# Patient Record
Sex: FEMALE | Race: WHITE | NOT HISPANIC OR LATINO | Employment: FULL TIME | URBAN - NONMETROPOLITAN AREA
[De-identification: names, ages, dates, MRNs, and addresses within clinical notes are randomized per-mention and may not be internally consistent; named-entity substitution may affect disease eponyms.]

---

## 2023-03-08 DIAGNOSIS — E03.9 HYPOTHYROIDISM, UNSPECIFIED TYPE: Primary | ICD-10-CM

## 2023-03-08 RX ORDER — LEVOTHYROXINE SODIUM 75 UG/1
75 TABLET ORAL
COMMUNITY
End: 2023-09-11 | Stop reason: SDUPTHER

## 2023-03-08 RX ORDER — LEVOTHYROXINE SODIUM 75 UG/1
TABLET ORAL
Qty: 90 TABLET | Refills: 3 | Status: SHIPPED | OUTPATIENT
Start: 2023-03-08 | End: 2024-02-26

## 2023-03-27 ENCOUNTER — HOSPITAL ENCOUNTER (OUTPATIENT)
Dept: DATA CONVERSION | Facility: HOSPITAL | Age: 58
End: 2023-03-27
Attending: OBSTETRICS & GYNECOLOGY | Admitting: OBSTETRICS & GYNECOLOGY
Payer: COMMERCIAL

## 2023-03-27 DIAGNOSIS — E03.9 HYPOTHYROIDISM, UNSPECIFIED: ICD-10-CM

## 2023-03-27 DIAGNOSIS — R87.613 HIGH GRADE SQUAMOUS INTRAEPITHELIAL LESION ON CYTOLOGIC SMEAR OF CERVIX (HGSIL): ICD-10-CM

## 2023-03-27 DIAGNOSIS — R87.611 ATYPICAL SQUAMOUS CELLS CANNOT EXCLUDE HIGH GRADE SQUAMOUS INTRAEPITHELIAL LESION ON CYTOLOGIC SMEAR OF CERVIX (ASC-H): ICD-10-CM

## 2023-03-27 DIAGNOSIS — E78.5 HYPERLIPIDEMIA, UNSPECIFIED: ICD-10-CM

## 2023-04-19 LAB
COMPLETE PATHOLOGY REPORT: NORMAL
CONVERTED CLINICAL DIAGNOSIS-HISTORY: NORMAL
CONVERTED FINAL DIAGNOSIS: NORMAL
CONVERTED FINAL REPORT PDF LINK TO COPY AND PASTE: NORMAL
CONVERTED GROSS DESCRIPTION: NORMAL

## 2023-06-14 DIAGNOSIS — E03.9 HYPOTHYROIDISM, UNSPECIFIED TYPE: ICD-10-CM

## 2023-06-14 PROBLEM — R21 RASH: Status: ACTIVE | Noted: 2023-06-14

## 2023-06-14 PROBLEM — R87.612 PAP SMEAR ABNORMALITY OF CERVIX WITH LGSIL: Status: ACTIVE | Noted: 2023-06-14

## 2023-06-14 PROBLEM — R13.12 DYSPHAGIA, OROPHARYNGEAL PHASE: Status: ACTIVE | Noted: 2023-06-14

## 2023-06-14 PROBLEM — R87.810 CERVICAL HIGH RISK HPV (HUMAN PAPILLOMAVIRUS) TEST POSITIVE: Status: ACTIVE | Noted: 2023-06-14

## 2023-06-14 PROBLEM — R59.1 LYMPHADENOPATHY: Status: ACTIVE | Noted: 2023-06-14

## 2023-06-14 PROBLEM — M25.521 RIGHT ELBOW PAIN: Status: ACTIVE | Noted: 2023-06-14

## 2023-06-14 PROBLEM — R22.1 NECK MASS: Status: ACTIVE | Noted: 2023-06-14

## 2023-06-14 PROBLEM — E78.5 HYPERLIPIDEMIA: Status: ACTIVE | Noted: 2023-06-14

## 2023-06-14 PROBLEM — R87.619 ABNORMAL PAP SMEAR OF CERVIX: Status: ACTIVE | Noted: 2023-06-14

## 2023-06-14 PROBLEM — L03.90 CELLULITIS: Status: ACTIVE | Noted: 2023-06-14

## 2023-06-14 RX ORDER — ESTRADIOL 4 UG/1
INSERT VAGINAL
Status: ON HOLD | COMMUNITY
Start: 2023-05-16 | End: 2024-01-25 | Stop reason: WASHOUT

## 2023-06-15 ENCOUNTER — LAB (OUTPATIENT)
Dept: LAB | Facility: LAB | Age: 58
End: 2023-06-15
Payer: COMMERCIAL

## 2023-06-15 DIAGNOSIS — E03.9 HYPOTHYROIDISM, UNSPECIFIED TYPE: ICD-10-CM

## 2023-06-15 LAB — THYROTROPIN (MIU/L) IN SER/PLAS BY DETECTION LIMIT <= 0.05 MIU/L: 0.63 MIU/L (ref 0.44–3.98)

## 2023-06-15 PROCEDURE — 84443 ASSAY THYROID STIM HORMONE: CPT

## 2023-06-15 PROCEDURE — 36415 COLL VENOUS BLD VENIPUNCTURE: CPT

## 2023-09-07 VITALS
DIASTOLIC BLOOD PRESSURE: 44 MMHG | RESPIRATION RATE: 16 BRPM | WEIGHT: 110.23 LBS | BODY MASS INDEX: 20.28 KG/M2 | SYSTOLIC BLOOD PRESSURE: 112 MMHG | HEART RATE: 64 BPM | HEIGHT: 62 IN | TEMPERATURE: 97.9 F

## 2023-09-11 PROBLEM — N89.8 VAGINAL DRYNESS: Status: ACTIVE | Noted: 2023-09-11

## 2023-09-11 PROBLEM — N94.10 DYSPAREUNIA, FEMALE: Status: ACTIVE | Noted: 2023-09-11

## 2023-09-13 NOTE — PROGRESS NOTES
"Cheryl Wyatt is a 57 y.o. female who presents for Annual Exam (Declining flu shot)    Abnormal pap: She had another abnormal pap, colposcopy/ and LEEP.  states that it isfrom erosion and started her on imvexxy. They are doing another papin October. If it is abnormal again she is going to pressure him to get a hysterectomy.      Hypothyroidism: On replacement, feeling good. Wants to stay at her current dose. She believes that the Imvexxy has affected her thyroid levels because she is gaining weight.      HLD: Diet controlled.      All other systems have been reviewed and are negative for complaint        Objective   /73   Pulse 70   Ht 1.575 m (5' 2\")   Wt 53.1 kg (117 lb)   BMI 21.40 kg/m²     Gen: No acute distress, alert and oriented x3, pleasant   HEENT: moist mucous membranes, b/l external auditory canals are clear of debris, TMs within normal limits, no oropharyngeal lesions, eomi, perrla   Neck: thyroid within normal limits, no lymphadenopathy   CV: RRR, normal S1/S2, no murmur   Resp: Clear to auscultation bilaterally, no wheezes or rhonchi appreciated  Abd: soft, nontender, non-distended, no guarding/rigidity, bowel sounds present  Extr: no edema, no calf tenderness  Derm: Skin is warm and dry, no rashes appreciated  Psych: mood is good, affect is congruent, good hygiene, normal speech and eye contact  Neuro: cranial nerves grossly intact, normal gait    Assessment/Plan     #Abnormal pap  Now on imvexxy  Planning for repeat in October  She is hoping for hysterectomy     #LAD  biopsy was negative  Has not followed up with ENT     #Hypothyroidism:  On replacement  TSH is low but she is feeling good     #HLD:  Diet controlled, [place] was under 2%  monitoring lipids     #Raynaud's disease:  Not on meds     HCM:  Declining flu shot, UTD for COVID  Mammogram June  Pap due October, gyn  C-scope due at age 59   "

## 2023-09-14 NOTE — H&P
History of Present Illness:   Pregnant/Lactating:  ·  Are You Pregnant no (1)   ·  Are You Currently Breastfeeding no (1)     History Present Illness:  Reason for surgery: Recurrent abnormal Pap smears,  rule out high-grade dysplasia   HPI:    Patient is a 57-year-old female with a past medical history that includes dysphagia, hyperlipidemia and hypothyroidism who is here for a cervical conization LEEP  procedure.  She was last seen by Dr. Cole on 2/6/2022.  She has had a long history of abnormal Pap smears.  A previous LEEP about 1 year ago showed no evidence of dysplasia but on a follow-up Pap there was low-grade changes, she was positive for HPV  and a colposcopy was done in office.  The last LEEP was February 24, 2022 at [facility] with Dr. Cole.  A repeat LEEP was suggested before a hysterectomy. She states she had a heavy amount of bleeding after her previous LEEP.     Past medical history: Dysphagia, hyperlipidemia, hypothyroidism  Past surgical history: LEEP, lymph node removal,/right shoulder, scope of right knee, back surgery which included a disc replacement of L4-L5 and a fusion of L5-S1  Social history: Denies all  Allergies: Flexeril    Allergies:        Allergies:  ·  Flexeril : Itching    Home Medication Review:   Home Medications Reviewed: yes     Impression/Procedure:   ·  Impression and Planned Procedure: Abnormal Pap smear  Cervical conization LEEP       ERAS (Enhanced Recovery After Surgery):  ·  ERAS Patient: no     Review of Systems:   Review of Systems:  Constitutional: NEGATIVE: Fever, Chills, Malaise     Eyes: NEGATIVE: Vision Loss/ Change     ENMT: NEGATIVE: Nasal Discharge, Nasal Congestion     Respiratory: NEGATIVE: Dry Cough, Productive Cough,  Shortness of Breath     Cardiac: NEGATIVE: Chest Pain     Gastrointestinal: NEGATIVE: Nausea, Vomiting     Genitourinary: NEGATIVE: Discharge, Dysuria, Flank  Pain, Frequency, Hematuria     Musculoskeletal: NEGATIVE: Weakness     Neurological:  "NEGATIVE: Headache     Skin: NEGATIVE: Rash     All Other Systems: All other systems reviewed and  are negative       Vital Signs:  Temperature C: 36.6 degrees C   Temperature F: 97.8 degrees F   Heart Rate: 64 beats per minute   Respiratory Rate: 16 breath per minute   Blood Pressure Systolic: 112 mm/Hg   Blood Pressure Diastolic: 44 mm/Hg     Physical Exam by System:    Constitutional: Well developed, A&O x3, in no apparent  distress, alert and cooperative   Eyes: PERRL, EOMI, nonicteric   ENMT: Mucous membranes moist, no apparent injury   Head/Neck: Neck supple, no apparent injury   Respiratory/Thorax: Patent airways, CTAB, normal  breath sounds with good chest expansion, thorax symmetric   Cardiovascular: Regular, normal S 1and S 2, no murmurs,  2+ equal pulses of the extremities   Neurological: CN II-XII grossly intact   Psychological: Appropriate mood and behavior   Skin: Warm and dry, no lesions, no rashes     Consent:   COVID-19 Consent:  ·  COVID-19 Risk Consent Surgeon has reviewed key risks related to the risk of eloina COVID-19 and if they contract COVID-19 what the risks are.       Electronic Signatures:  Mitul Cole)   (Signed 27-Mar-2023 17:12)   Co-Signer: History of Present Illness, Allergies, Home Medication Review, Impression/Procedure, Review of Systems, Physical Exam, Consent, Note Completion, Maryjo Dhillon (PAC)   (Signed 27-Mar-2023 09:46)   Entered: Allergies, History of Present Illness, Home Medication Review, Impression/Procedure, Review of Systems, Physical Exam, ERAS, Consent, Note Completion   Authored: History of Present Illness, Allergies, Home Medication Review, Impression/Procedure, Review of Systems, Physical Exam, Consent, Note Completion, ERAS    Last Updated: 27-Mar-2023 17:12 by Mitul Cole)    References:  1.  Data Referenced From \"Patient Profile - Preop v3\" 27-Mar-2023 08:27  "

## 2023-09-20 ENCOUNTER — OFFICE VISIT (OUTPATIENT)
Dept: PRIMARY CARE | Facility: CLINIC | Age: 58
End: 2023-09-20
Payer: COMMERCIAL

## 2023-09-20 VITALS
HEART RATE: 70 BPM | HEIGHT: 62 IN | WEIGHT: 117 LBS | SYSTOLIC BLOOD PRESSURE: 116 MMHG | BODY MASS INDEX: 21.53 KG/M2 | DIASTOLIC BLOOD PRESSURE: 73 MMHG

## 2023-09-20 DIAGNOSIS — Z12.31 ENCOUNTER FOR SCREENING MAMMOGRAM FOR MALIGNANT NEOPLASM OF BREAST: Primary | ICD-10-CM

## 2023-09-20 DIAGNOSIS — E03.9 HYPOTHYROIDISM, UNSPECIFIED TYPE: ICD-10-CM

## 2023-09-20 DIAGNOSIS — Z13.220 SCREENING FOR HYPERLIPIDEMIA: ICD-10-CM

## 2023-09-20 PROBLEM — R22.1 NECK MASS: Status: RESOLVED | Noted: 2023-06-14 | Resolved: 2023-09-20

## 2023-09-20 PROBLEM — R13.12 DYSPHAGIA, OROPHARYNGEAL PHASE: Status: RESOLVED | Noted: 2023-06-14 | Resolved: 2023-09-20

## 2023-09-20 PROBLEM — M25.521 RIGHT ELBOW PAIN: Status: RESOLVED | Noted: 2023-06-14 | Resolved: 2023-09-20

## 2023-09-20 PROBLEM — R21 RASH: Status: RESOLVED | Noted: 2023-06-14 | Resolved: 2023-09-20

## 2023-09-20 PROBLEM — L03.90 CELLULITIS: Status: RESOLVED | Noted: 2023-06-14 | Resolved: 2023-09-20

## 2023-09-20 PROBLEM — R87.619 ABNORMAL PAP SMEAR OF CERVIX: Status: RESOLVED | Noted: 2023-06-14 | Resolved: 2023-09-20

## 2023-09-20 PROCEDURE — 99214 OFFICE O/P EST MOD 30 MIN: CPT | Performed by: FAMILY MEDICINE

## 2023-09-20 PROCEDURE — 1036F TOBACCO NON-USER: CPT | Performed by: FAMILY MEDICINE

## 2023-10-02 NOTE — OP NOTE
Post Operative Note:     Post-Procedure Diagnosis: HGSIL Pap   Procedure: 1.  LEEP  2.   3.   4.   5.   Surgeon: Kelsey   Resident/Fellow/Other Assistant: No   Estimated Blood Loss (mL): none   Specimen: yes. Exocervix   Findings: Atrophic vagina and cervix     Operative Report Dictated:  Dictation: not applicable - note contains Operative  Report   Note Recipients: Mitul Cole MD  Danii Cortez  - [phone] [Preferred]   Operative Report:    March 27, 2023, [facility]    Preop diagnosis was HGSIL Pap, history of prior LEEP procedure    Postop diagnosis the same    Surgeon myself    General anesthetic    Under general anesthetic lithotomy position patient prepped draped usual manner.  Plainfield insulated speculum placed.  Using the medium size loop electrode the exocervix was excised.  Base of the cervix cauterized for hemostasis.  Minimal blood loss.   Correct counts.  Exocervix sent as pathology.  Tolerated procedure well taken recovery room in stable condition.    Attestation:   Note Completion:  Attending Attestation I performed the procedure without a resident         Electronic Signatures:  Mitlu Cole)  (Signed 27-Mar-2023 11:22)   Authored: Post Operative Note, Note Completion      Last Updated: 27-Mar-2023 11:22 by Mitul Cole)

## 2023-10-14 ENCOUNTER — OFFICE VISIT (OUTPATIENT)
Dept: OBSTETRICS AND GYNECOLOGY | Facility: CLINIC | Age: 58
End: 2023-10-14
Payer: COMMERCIAL

## 2023-10-14 VITALS — BODY MASS INDEX: 21.4 KG/M2 | WEIGHT: 117 LBS | DIASTOLIC BLOOD PRESSURE: 70 MMHG | SYSTOLIC BLOOD PRESSURE: 115 MMHG

## 2023-10-14 DIAGNOSIS — R87.611 ATYPICAL SQUAMOUS CELLS CANNOT EXCLUDE HIGH GRADE SQUAMOUS INTRAEPITHELIAL LESION ON CYTOLOGIC SMEAR OF CERVIX (ASC-H): Primary | ICD-10-CM

## 2023-10-14 PROCEDURE — 87624 HPV HI-RISK TYP POOLED RSLT: CPT

## 2023-10-14 PROCEDURE — 88141 CYTOPATH C/V INTERPRET: CPT | Performed by: PATHOLOGY

## 2023-10-14 PROCEDURE — 99214 OFFICE O/P EST MOD 30 MIN: CPT | Performed by: OBSTETRICS & GYNECOLOGY

## 2023-10-14 PROCEDURE — 1036F TOBACCO NON-USER: CPT | Performed by: OBSTETRICS & GYNECOLOGY

## 2023-10-14 PROCEDURE — 88175 CYTOPATH C/V AUTO FLUID REDO: CPT

## 2023-10-14 NOTE — PROGRESS NOTES
Subjective   Patient ID: Cheryl Wyatt is a 58 y.o. female who presents for repap (Leep 3/23).  58-year-old patient following up after recent LEEP procedure     0.  In menopause.  In her 30s she had abnormal Paps and was interested in hysterectomy but insurance that she was too young    In the past 2 years she has had abnormal Pap smears.  We did a Pap in 2022 and then another Pap in 2023.  Neither of the surgical pathology showed any evidence of dysplasia.    She comes in today for follow-up Pap but she is at a point where if she continues to have abnormal Pap smears she would really like to proceed with hysterectomy.  We did review that there are different types of abnormalities so an minor abnormality would not necessitate a hysterectomy but if there is evidence of dysplasia I would 100% agree that it is time to proceed with hysterectomy since she has had 2 LEEP procedures in the past year and a half        Review of Systems   All other systems reviewed and are negative.      Objective   Physical Exam    Assessment/Plan   Triage based on today's Pap results.  2 prior LEEP procedures with no evidence of dysplasia.  Longstanding history of abnormal Paps.  Interested in hysterectomy if evidence of dysplasia recurs  SURGICAL PATHOLOGY RESULTS (Order [id number])  Result Information    Status Priority Source   Final result (2023 1527) Routine CERVIX LEEP     SURGICAL PATHOLOGY RESULTS  Order: [id number]  Status: Final result       Visible to patient: Yes (seen)       Dx: Atypical squamous cells cannot exclud...    0 Result Notes      Component 6 mo ago   Pathology Report Name CHERYL WYATT.                                                                                                 Accession #: [id number]            Pathologist:                   BARBARA FUNG DO  Date of Procedure:    3/27/2023  Date Received:          3/27/2023  Date Reported           2023  Submitting Physician:    SERAFIN COLE MD  Location:                    [facility]  Copy To/Referring/Attending:  SERAFIN COLE MD Other External #                                                                   FINAL DIAGNOSIS  A.  EXOCERVIX; LEEP:  -- SQUAMOUS AND ENDOCERVICAL MUCOSA WITH ALMOST COMPLETE EROSION OF THE  SQUAMOUS EPITHELIUM. SEE NOTE.    Note: Multiple deeper levels were examined.  The endocervical mucosa shows no  significant pathologic finding.  The squamous mucosa shows almost complete  erosion of the epithelium, with only a small foci of atrophic appearing  squamous epithelium present.                                                                                                                                                                                                                                                                                                                                                                                                                                                                                        Electronically Signed Out By BARBARA FUNG DO/DOM  By the signature on this report, the individual or group listed as making the  Final Interpretation/Diagnosis certifies that they have reviewed this case.  Diagnostic interpretation performed at [facility] [address] [facility] [address]           Clinical History:  Patient is a 57-year old female with a past medical history that includes  dysphagia, hyperlipidemia, adn hypothyroidism who is here for a cervical  conization LEEP procedure.  She was last seen by Dr. Cole on 2/6/2022. She has  had a long history of abnormal Pap smears. A previous LEEP about 1 year ago  showed no evidence of dysplasia but on a follow-up Pap there was low-grade  changes, she was positive for HPV and a colposcopy was done in office.  The  last LEEP was February 24, 2022 at [facility] with Dr. Cole. A repeat LEEP was  suggested  "before a hysterectomy.  She state she had a heavy amount of bleeding  after her previous LEEP.    Specimens Submitted As:  A: EXOCERVIX    Gross Description:  Received in formalin, labeled with the patient´s name hospital number and \"A,  exocervix\", is an intact LEEP cone biopsy without orientation measuring 1.3 x  1.2 cm in diameter and 1.0 cm in length. The cervical os is intact and measures  0.3 cm in greatest diameter. The ectocervix is smooth and glistening. The deep  stromal margin and mucosal surfaces are inked black and the endocervical margin  is inked blue. The specimen is serially sectioned radially around the  endocervical canal, and submitted entirely in 8 cassettes.  SURJIT     Summary of Cassettes:  Specimen     Label     Site  A                            1           Quadrant 1                            2           Additional mucosa/trimmings, quadrant 1                            3           Quadrant 2                            4           Additional mucosa/trimmings, quadrant 2                            5           Quadrant 3                            6           Additional mucosa/trimmings, quadrant 3                            7           Quadrant 4                            8           Additional mucosa/trimmings, quadrant 4      surjit/4/12/2023              [facility]  Department of Pathology  [id number] [place] [address]        History of abnormal Pap smears.  2 LEEP procedures in the past year and a half.  Pap obtained today.  Will review results.  Based on results we will treat appropriately    She has been using Imvexxy.  She finds is not really giving her any relief so she will discontinue.     "

## 2023-10-23 ENCOUNTER — HOSPITAL ENCOUNTER (OUTPATIENT)
Dept: RADIOLOGY | Facility: HOSPITAL | Age: 58
Discharge: HOME | End: 2023-10-23
Payer: COMMERCIAL

## 2023-10-23 DIAGNOSIS — Z12.31 ENCOUNTER FOR SCREENING MAMMOGRAM FOR MALIGNANT NEOPLASM OF BREAST: ICD-10-CM

## 2023-10-23 PROCEDURE — 77067 SCR MAMMO BI INCL CAD: CPT

## 2023-10-23 PROCEDURE — 77067 SCR MAMMO BI INCL CAD: CPT | Mod: BILATERAL PROCEDURE | Performed by: RADIOLOGY

## 2023-10-23 PROCEDURE — 77063 BREAST TOMOSYNTHESIS BI: CPT | Mod: BILATERAL PROCEDURE | Performed by: RADIOLOGY

## 2023-10-26 LAB
CYTOLOGY CMNT CVX/VAG CYTO-IMP: NORMAL
HPV HR GENOTYPES PNL CVX NAA+PROBE: POSITIVE
HPV HR GENOTYPES PNL CVX NAA+PROBE: POSITIVE
HPV16 DNA SPEC QL NAA+PROBE: NEGATIVE
HPV18 DNA SPEC QL NAA+PROBE: NEGATIVE
LAB AP HPV GENOTYPE QUESTION: YES
LAB AP HPV HR: NORMAL
LAB AP PREVIOUS ABNORMAL HISTORY: NORMAL
LAB AP TREATMENT HISTORY: NORMAL
LABORATORY COMMENT REPORT: NORMAL
MENSTRUAL HX REPORTED: NORMAL
PATH REPORT.TOTAL CANCER: NORMAL

## 2023-11-10 ENCOUNTER — TELEPHONE (OUTPATIENT)
Dept: PRIMARY CARE | Facility: CLINIC | Age: 58
End: 2023-11-10
Payer: COMMERCIAL

## 2023-11-10 DIAGNOSIS — R21 RASH: Primary | ICD-10-CM

## 2023-11-10 NOTE — TELEPHONE ENCOUNTER
Cheryl called and stated that she found a tick on her a few weeks back that she was able to remove. However, the site is still red in color. She has blood work that was ordered back in September, she is wondering if you can add in a lyme test?

## 2023-11-13 ENCOUNTER — LAB (OUTPATIENT)
Dept: LAB | Facility: LAB | Age: 58
End: 2023-11-13
Payer: COMMERCIAL

## 2023-11-13 DIAGNOSIS — Z13.220 SCREENING FOR HYPERLIPIDEMIA: ICD-10-CM

## 2023-11-13 DIAGNOSIS — R21 RASH: ICD-10-CM

## 2023-11-13 DIAGNOSIS — E03.9 HYPOTHYROIDISM, UNSPECIFIED TYPE: ICD-10-CM

## 2023-11-13 LAB
ALBUMIN SERPL BCP-MCNC: 4.9 G/DL (ref 3.4–5)
ALP SERPL-CCNC: 75 U/L (ref 33–136)
ALT SERPL W P-5'-P-CCNC: 12 U/L (ref 7–45)
ANION GAP SERPL CALC-SCNC: 13 MMOL/L (ref 10–20)
AST SERPL W P-5'-P-CCNC: 20 U/L (ref 9–39)
BASOPHILS # BLD AUTO: 0.03 X10*3/UL (ref 0–0.1)
BASOPHILS NFR BLD AUTO: 0.6 %
BILIRUB SERPL-MCNC: 0.5 MG/DL (ref 0–1.2)
BUN SERPL-MCNC: 13 MG/DL (ref 6–23)
CALCIUM SERPL-MCNC: 9.9 MG/DL (ref 8.6–10.3)
CHLORIDE SERPL-SCNC: 104 MMOL/L (ref 98–107)
CHOLEST SERPL-MCNC: 318 MG/DL (ref 0–199)
CHOLESTEROL/HDL RATIO: 4.1
CO2 SERPL-SCNC: 31 MMOL/L (ref 21–32)
CREAT SERPL-MCNC: 0.91 MG/DL (ref 0.5–1.05)
EOSINOPHIL # BLD AUTO: 0.07 X10*3/UL (ref 0–0.7)
EOSINOPHIL NFR BLD AUTO: 1.4 %
ERYTHROCYTE [DISTWIDTH] IN BLOOD BY AUTOMATED COUNT: 12.7 % (ref 11.5–14.5)
GFR SERPL CREATININE-BSD FRML MDRD: 72 ML/MIN/1.73M*2
GLUCOSE SERPL-MCNC: 77 MG/DL (ref 74–99)
HCT VFR BLD AUTO: 45.3 % (ref 36–46)
HDLC SERPL-MCNC: 76.9 MG/DL
HGB BLD-MCNC: 14.6 G/DL (ref 12–16)
IMM GRANULOCYTES # BLD AUTO: 0.01 X10*3/UL (ref 0–0.7)
IMM GRANULOCYTES NFR BLD AUTO: 0.2 % (ref 0–0.9)
LDLC SERPL CALC-MCNC: 219 MG/DL
LYMPHOCYTES # BLD AUTO: 1.59 X10*3/UL (ref 1.2–4.8)
LYMPHOCYTES NFR BLD AUTO: 32.6 %
MCH RBC QN AUTO: 30.5 PG (ref 26–34)
MCHC RBC AUTO-ENTMCNC: 32.2 G/DL (ref 32–36)
MCV RBC AUTO: 95 FL (ref 80–100)
MONOCYTES # BLD AUTO: 0.43 X10*3/UL (ref 0.1–1)
MONOCYTES NFR BLD AUTO: 8.8 %
NEUTROPHILS # BLD AUTO: 2.75 X10*3/UL (ref 1.2–7.7)
NEUTROPHILS NFR BLD AUTO: 56.4 %
NON HDL CHOLESTEROL: 241 MG/DL (ref 0–149)
NRBC BLD-RTO: 0 /100 WBCS (ref 0–0)
PLATELET # BLD AUTO: 264 X10*3/UL (ref 150–450)
POTASSIUM SERPL-SCNC: 5.6 MMOL/L (ref 3.5–5.3)
PROT SERPL-MCNC: 7.2 G/DL (ref 6.4–8.2)
RBC # BLD AUTO: 4.79 X10*6/UL (ref 4–5.2)
SODIUM SERPL-SCNC: 142 MMOL/L (ref 136–145)
T4 FREE SERPL-MCNC: 1.22 NG/DL (ref 0.61–1.12)
TRIGL SERPL-MCNC: 112 MG/DL (ref 0–149)
TSH SERPL-ACNC: 0.31 MIU/L (ref 0.44–3.98)
VLDL: 22 MG/DL (ref 0–40)
WBC # BLD AUTO: 4.9 X10*3/UL (ref 4.4–11.3)

## 2023-11-13 PROCEDURE — 84443 ASSAY THYROID STIM HORMONE: CPT

## 2023-11-13 PROCEDURE — 80053 COMPREHEN METABOLIC PANEL: CPT

## 2023-11-13 PROCEDURE — 85025 COMPLETE CBC W/AUTO DIFF WBC: CPT

## 2023-11-13 PROCEDURE — 84439 ASSAY OF FREE THYROXINE: CPT

## 2023-11-13 PROCEDURE — 80061 LIPID PANEL: CPT

## 2023-11-13 PROCEDURE — 87476 LYME DIS DNA AMP PROBE: CPT

## 2023-11-13 PROCEDURE — 36415 COLL VENOUS BLD VENIPUNCTURE: CPT

## 2023-11-17 LAB
B BURGDOR DNA SPEC QL NAA+PROBE: NOT DETECTED
SPECIMEN SOURCE: NORMAL

## 2023-11-21 ENCOUNTER — OFFICE VISIT (OUTPATIENT)
Dept: OBSTETRICS AND GYNECOLOGY | Facility: CLINIC | Age: 58
End: 2023-11-21
Payer: COMMERCIAL

## 2023-11-21 ENCOUNTER — PREP FOR PROCEDURE (OUTPATIENT)
Dept: OBSTETRICS AND GYNECOLOGY | Facility: HOSPITAL | Age: 58
End: 2023-11-21

## 2023-11-21 VITALS
WEIGHT: 120 LBS | HEIGHT: 62 IN | SYSTOLIC BLOOD PRESSURE: 102 MMHG | BODY MASS INDEX: 22.08 KG/M2 | DIASTOLIC BLOOD PRESSURE: 62 MMHG

## 2023-11-21 DIAGNOSIS — R87.810 CERVICAL HIGH RISK HPV (HUMAN PAPILLOMAVIRUS) TEST POSITIVE: ICD-10-CM

## 2023-11-21 DIAGNOSIS — N87.9 DYSPLASIA OF CERVIX: Primary | ICD-10-CM

## 2023-11-21 DIAGNOSIS — R87.612 LOW GRADE SQUAMOUS INTRAEPITHELIAL LESION ON CYTOLOGIC SMEAR OF CERVIX (LGSIL): ICD-10-CM

## 2023-11-21 PROCEDURE — 99214 OFFICE O/P EST MOD 30 MIN: CPT | Performed by: OBSTETRICS & GYNECOLOGY

## 2023-11-21 PROCEDURE — 1036F TOBACCO NON-USER: CPT | Performed by: OBSTETRICS & GYNECOLOGY

## 2023-11-21 RX ORDER — CELECOXIB 50 MG/1
400 CAPSULE ORAL ONCE
Status: CANCELLED | OUTPATIENT
Start: 2023-11-21 | End: 2023-11-21

## 2023-11-21 RX ORDER — GABAPENTIN 600 MG/1
600 TABLET ORAL ONCE
Status: CANCELLED | OUTPATIENT
Start: 2023-11-21 | End: 2023-11-21

## 2023-11-21 RX ORDER — ACETAMINOPHEN 325 MG/1
975 TABLET ORAL ONCE
Status: CANCELLED | OUTPATIENT
Start: 2023-11-21 | End: 2023-11-21

## 2023-11-21 NOTE — PROGRESS NOTES
Subjective   Patient ID: Cheryl Wyatt is a 58 y.o. female who presents for Preop consult.    A.  THINPREP PAP ENDOCERVICAL BRUSH:         Specimen adequacy:  SATISFACTORY FOR EVALUATION.  Quality Indicator: Endocervical/transformation zone component is present.         General Categorization:   EPITHELIAL CELL ABNORMALITY - SQUAMOUS CELL.   See Interpretation.         Descriptive Interpretation:  LOW GRADE SQUAMOUS INTRAEPITHELIAL LESION (LSIL) - CERVIX.  ATYPICAL SQUAMOUS CELLS, CANNOT EXCLUDE HIGH GRADE SQUAMOUS INTRAEPITHELIAL  LESION (ASC-H) - CERVIX.    Ancillary Testing:  Specimen does not meet the requisition-stated criteria for HPV testing. See Pap  test interpretation above.      The gross and/or microscopic findings were reviewed in conjunction with  pathology resident, TREVIN Iraheta.    tus: Final result       Visible to patient: Yes (seen)       Next appt: 03/25/2024 at 10:30 AM in Primary Care (Danii Cortez DO)       Dx: Atypical squamous cells cannot exclud...    2 Result Notes       1  Topic     Component  Ref Range & Units   HPV, high-risk  Negative Positive Abnormal   HPV Type 16 DNA  Negative Negative  HPV Type 18 DNA  Negative Negative  HPV non-Type 16 or 18 DNA  Negative Positive Abnormal   Resulting Agency [facility]           Narrative  Performed by: [facility]     Testing for high-risk (HR) types of human papilloma virus (HPV) is performed by the Roche courtney HPV Test. The courtney HPV Test is a qualitative polymerase chain reaction that amplifies DNA of HPV16, HPV18, and 12 other high-risk HPV types (31, 33, 35, 39, 45, 51, 52, 56, 58, 59, 66, and 68) associated with cervical cancer and its precursor lesions. A positive result indicates the presence of HPV DNA due to one or more of the 14 genotypes: 16, 18, 31, 33, 35, 39, 45, 51, 52, 56, 58, 59, 66, and 68. Negative results indicates HPV DNA concentrations are undectectable or below the pre-set threshold for detection. False negative results  may be associated with unoptimized sampling. A negative HR HPV result does not exclude the possibility of future cytologic HSIL or underlying CIN2-3 or cancer.   This test is approved by the US Food and Drug Administration. Results of this test should be interpreted in conjunction with the patient Pap test results. Please refer to ASCCP current quidelines for the use of HPV DNA testing, result interpretation, and patient management.   The performance of this test was verified by the [facility] at [facility]. The lab is certified under the Clinical Laboratory Amendments of 1988 (CLIA 88) as qualified to perform high complexity clinical laboratory testing.    PERFORMING LAB LOCATIONS  [facility]: [facility] [address], [place] [address]    Specimen Collected: 10/14/23 13:29 Last Resulted: 10/26/23 12:41           Established patient 58 years old.  She had 2 previous LEEP procedures.  Recent Pap shows low-grade changes but cannot rule out high-grade.  There is positive high risk HPV.  We have discussed in the past that if she has recurrence of abnormal Paps she is interested in hysterectomy    Past surgery includes an anterior approach to a spinal fusion.  She has a midline incision from the umbilicus on down to the pubic bone.  She also has a scar on the low back.    Current medications include Synthroid.  Allergic to Flexeril gets itchy and jittery.  Mother had a hysterectomy and a bladder lift.  Grandmother with uterine cancer.    Non-smoker.    Would recommend laparoscopic assisted hysterectomy with removal of both tubes and ovaries.  We would start in the left upper quadrant because of her midline incision.  Will review the surgery risk benefits complications and recovery.        Review of Systems   All other systems reviewed and are negative.      Objective   cervix flush with the vagina.  Uterus mobile.  No adnexal masses midline incision from anterior approach to  spinal fusion    Assessment/Plan   Left upper quadrant start.  Laparoscopic-assisted vaginal hysterectomy bilateral salpingo-oophorectomy cystoscopy

## 2023-12-12 ENCOUNTER — PRE-ADMISSION TESTING (OUTPATIENT)
Dept: PREADMISSION TESTING | Facility: HOSPITAL | Age: 58
End: 2023-12-12
Payer: COMMERCIAL

## 2023-12-12 VITALS
TEMPERATURE: 97.9 F | HEART RATE: 68 BPM | BODY MASS INDEX: 22.15 KG/M2 | WEIGHT: 120.37 LBS | SYSTOLIC BLOOD PRESSURE: 112 MMHG | HEIGHT: 62 IN | DIASTOLIC BLOOD PRESSURE: 71 MMHG | RESPIRATION RATE: 18 BRPM | OXYGEN SATURATION: 100 %

## 2023-12-12 DIAGNOSIS — R87.810 CERVICAL HIGH RISK HPV (HUMAN PAPILLOMAVIRUS) TEST POSITIVE: ICD-10-CM

## 2023-12-12 DIAGNOSIS — N87.9 DYSPLASIA OF CERVIX: ICD-10-CM

## 2023-12-12 DIAGNOSIS — Z01.818 PREOP EXAMINATION: Primary | ICD-10-CM

## 2023-12-12 LAB
ABO GROUP (TYPE) IN BLOOD: NORMAL
ANION GAP SERPL CALC-SCNC: 11 MMOL/L (ref 10–20)
ANTIBODY SCREEN: NORMAL
BUN SERPL-MCNC: 11 MG/DL (ref 6–23)
CALCIUM SERPL-MCNC: 9.3 MG/DL (ref 8.6–10.3)
CHLORIDE SERPL-SCNC: 103 MMOL/L (ref 98–107)
CO2 SERPL-SCNC: 31 MMOL/L (ref 21–32)
CREAT SERPL-MCNC: 0.89 MG/DL (ref 0.5–1.05)
ERYTHROCYTE [DISTWIDTH] IN BLOOD BY AUTOMATED COUNT: 12.7 % (ref 11.5–14.5)
GFR SERPL CREATININE-BSD FRML MDRD: 74 ML/MIN/1.73M*2
GLUCOSE SERPL-MCNC: 88 MG/DL (ref 74–99)
HCT VFR BLD AUTO: 39.6 % (ref 36–46)
HGB BLD-MCNC: 12.9 G/DL (ref 12–16)
MCH RBC QN AUTO: 30.2 PG (ref 26–34)
MCHC RBC AUTO-ENTMCNC: 32.6 G/DL (ref 32–36)
MCV RBC AUTO: 93 FL (ref 80–100)
NRBC BLD-RTO: 0 /100 WBCS (ref 0–0)
PLATELET # BLD AUTO: 260 X10*3/UL (ref 150–450)
POTASSIUM SERPL-SCNC: 4.3 MMOL/L (ref 3.5–5.3)
RBC # BLD AUTO: 4.27 X10*6/UL (ref 4–5.2)
RH FACTOR (ANTIGEN D): NORMAL
SODIUM SERPL-SCNC: 141 MMOL/L (ref 136–145)
WBC # BLD AUTO: 5.7 X10*3/UL (ref 4.4–11.3)

## 2023-12-12 PROCEDURE — 93010 ELECTROCARDIOGRAM REPORT: CPT | Performed by: INTERNAL MEDICINE

## 2023-12-12 PROCEDURE — 99204 OFFICE O/P NEW MOD 45 MIN: CPT | Performed by: PHYSICIAN ASSISTANT

## 2023-12-12 PROCEDURE — 93005 ELECTROCARDIOGRAM TRACING: CPT

## 2023-12-12 PROCEDURE — 80048 BASIC METABOLIC PNL TOTAL CA: CPT

## 2023-12-12 PROCEDURE — 86901 BLOOD TYPING SEROLOGIC RH(D): CPT

## 2023-12-12 PROCEDURE — 85027 COMPLETE CBC AUTOMATED: CPT

## 2023-12-12 PROCEDURE — 36415 COLL VENOUS BLD VENIPUNCTURE: CPT

## 2023-12-12 ASSESSMENT — ENCOUNTER SYMPTOMS
MUSCULOSKELETAL NEGATIVE: 1
GASTROINTESTINAL NEGATIVE: 1
CARDIOVASCULAR NEGATIVE: 1
NEUROLOGICAL NEGATIVE: 1
RESPIRATORY NEGATIVE: 1
CONSTITUTIONAL NEGATIVE: 1
NECK NEGATIVE: 1

## 2023-12-12 ASSESSMENT — DUKE ACTIVITY SCORE INDEX (DASI)
CAN YOU WALK A BLOCK OR TWO ON LEVEL GROUND: YES
CAN YOU DO MODERATE WORK AROUND THE HOUSE LIKE VACUUMING, SWEEPING FLOORS OR CARRYING GROCERIES: YES
CAN YOU WALK INDOORS, SUCH AS AROUND YOUR HOUSE: YES
CAN YOU PARTICIPATE IN MODERATE RECREATIONAL ACTIVITIES LIKE GOLF, BOWLING, DANCING, DOUBLES TENNIS OR THROWING A BASEBALL OR FOOTBALL: NO
CAN YOU DO HEAVY WORK AROUND THE HOUSE LIKE SCRUBBING FLOORS OR LIFTING AND MOVING HEAVY FURNITURE: NO
CAN YOU RUN A SHORT DISTANCE: NO
CAN YOU PARTICIPATE IN STRENOUS SPORTS LIKE SWIMMING, SINGLES TENNIS, FOOTBALL, BASKETBALL, OR SKIING: NO
CAN YOU DO YARD WORK LIKE RAKING LEAVES, WEEDING OR PUSHING A MOWER: YES
DASI METS SCORE: 6.3
CAN YOU CLIMB A FLIGHT OF STAIRS OR WALK UP A HILL: YES
TOTAL_SCORE: 28.7
CAN YOU DO LIGHT WORK AROUND THE HOUSE LIKE DUSTING OR WASHING DISHES: YES
CAN YOU HAVE SEXUAL RELATIONS: YES
CAN YOU TAKE CARE OF YOURSELF (EAT, DRESS, BATHE, OR USE TOILET): YES

## 2023-12-12 ASSESSMENT — CHADS2 SCORE
CHADS2 SCORE: 0
CHF: NO
HYPERTENSION: NO
DIABETES: NO
PRIOR STROKE OR TIA OR THROMBOEMBOLISM: NO
AGE GREATER THAN OR EQUAL TO 75: NO

## 2023-12-12 ASSESSMENT — PAIN SCALES - GENERAL: PAINLEVEL_OUTOF10: 0 - NO PAIN

## 2023-12-12 ASSESSMENT — LIFESTYLE VARIABLES: SMOKING_STATUS: NONSMOKER

## 2023-12-12 ASSESSMENT — PAIN - FUNCTIONAL ASSESSMENT: PAIN_FUNCTIONAL_ASSESSMENT: 0-10

## 2023-12-12 NOTE — PREPROCEDURE INSTRUCTIONS
Medication List            Accurate as of December 12, 2023  2:50 PM. Always use your most recent med list.                Imvexxy Maintenance Pack 4 mcg insert  Generic drug: estradioL  Medication Adjustments for Surgery: Other (Comment)  Notes to patient: Pt no longer taking     Synthroid 75 mcg tablet  Generic drug: levothyroxine  TAKE 1 TABLET BY MOUTH ONCE DAILY IN THE MORNING 1 HOUR BEFORE BREAKFAST  Medication Adjustments for Surgery: Take morning of surgery with sip of water, no other fluids                SURGERY PRE-OPERATIVE INSTRUCTIONS    *You will receive a phone call the day before your procedure  after 2pm, (or the Friday before your surgery if scheduled on a Monday.) Generally the hospital will be calling you with this information after that time.    *You are not to eat after midnight the night before the surgery. You may have 8oz of a clear liquid up until 2 hours prior to arriving to the hospital. The exception is with medications you were instructed to take day of surgery.    *You may take tylenol for pain/discomfort as needed.     *Stop taking all aspirin products, ibuprofen (motrin/advil), naproxen (aleve/naprosyn) for one week prior to surgery.    *Stop taking all vitamins and supplements one week prior to surgery.     *You should not have alcoholic beverages for 24 hours before surgery.     *You should not smoke 24 hours prior to surgery.     *To help prevent surgical infections bathe/shower with Dial soap the evening before surgery.    *You can wear deodorant but no lotion, powder, or perfume/cologne. You should remove all make-up and nail polish at home.    *If you wear glasses, please bring a case for the glasses with you.    *You will be asked to remove dentures and contacts.     *Please leave all valuables at home.    *You should wear loose, comfortable clothing that will accommodate bandages and/or casts.    *You should notify your doctor of any change in your condition (fever, cold,  rash, etc). Surgery may need to be re-scheduled until a time you are in better health.    *A responsible adult is required to accompany you to and from the hospital if you are receiving anesthesia or a sedative. Patients are not permitted to drive for 24 hours after anesthesia.     *You can use the Rapid7g if you wish.     *If you have any further questions please call [phone].     CHG BODY WASH INSTRUCTIONS    *Begin using your CHG soap five days prior to your scheduled surgery.  Allow the CHG soap to sit on skin for 3 minutes. Do not wash with regular soap after you have used the CHG soap. Pat yourself dry with a clean, fresh towel.    *Wash your face with normal soap and water. Apply the CHG solution to a clean, wet washcloth. Firmly lather your entire body from the neck down. Do not use on your face.     *Do not apply powders, deodorants, or lotions after using CHG wash.    *Dress in clean, freshly laundered night clothes.    *Be sure to sleep with clean, freshly laundered sheets.     *Be aware CHG wash may cause stains on fabrics. Rinse your washcloth and other linens that come in contact with CHG completely. Use non-chlorine detergents to launder items used.     *The morning of surgery is the fifth day, repeat the CHG wash and wear fresh laundered clothes.     *If you have any questions about the CHG soap, call [phone].

## 2023-12-12 NOTE — CPM/PAT H&P
CPM/PAT Evaluation       Name: Cheryl Wyatt (Cheryl Wyatt)  /Age: 1965/58 y.o.     In-Person       Chief Complaint: abnormal PAP     59 y/o female scheduled for hysterectomy on 23 with Dr. Cole secondary to abnormal PAP.  PMHX includes hypothyroidism.  Presents to [facility] today for perioperative risk stratification. Patient has had multiple abnormal PAPs and been treated with multiple colposcopies and a LEEP procedure but continues to have abnormal PAPs after 5 years. Requests hysterectomy.         Past Medical History:   Diagnosis Date    Cervical dysplasia     Cervical high risk human papillomavirus (HPV) DNA test positive     History of Graves' disease     Hypothyroidism     PONV (postoperative nausea and vomiting)     Raynaud's disease        Past Surgical History:   Procedure Laterality Date    ANTERIOR SPINAL FUSION      L5-S1    CERVICAL BIOPSY  W/ LOOP ELECTRODE EXCISION      x2    LUMBAR DISCECTOMY      L4-5    LYMPH NODE BIOPSY      US GUIDED MEDIASTINUM PERCUTANEOUS BIOPSY  10/14/2021    US GUIDED MEDIASTINUM PERCUTANEOUS BIOPSY 10/14/2021 [facility]       Patient  has no history on file for sexual activity.    Family History   Problem Relation Name Age of Onset    Stroke Mother          s/p surgery    Brain Aneurysm Mother  88    Other (cardiac valve replacement) Father      Coronary artery disease Father      Coronary artery disease Sister      Rheum arthritis Sister      Coronary artery disease Brother      Uterine cancer Paternal Grandmother         Allergies   Allergen Reactions    Cyclobenzaprine Itching       Prior to Admission medications    Medication Sig Start Date End Date Taking? Authorizing Provider   Synthroid 75 mcg tablet TAKE 1 TABLET BY MOUTH ONCE DAILY IN THE MORNING 1 HOUR BEFORE BREAKFAST 3/8/23  Yes Danii Cortez DO   Imvexxy Maintenance Pack 4 mcg insert INSERT 4 MCG AS DIRECTED TWICE WEEKLY 23   Historical Provider, MD DIETZ ROS:    Constitutional:   neg    Neuro/Psych:   neg    Eyes:   Ears:   Nose:   Mouth:   neg    Throat:   neg    Neck:   neg    Cardio:   neg    Respiratory:   neg    Endocrine:   GI:   neg    :   neg    Musculoskeletal:   neg    Hematologic:   neg    Skin:      Physical Exam  Vitals and nursing note reviewed.   Constitutional:       Appearance: Normal appearance.   HENT:      Head: Normocephalic and atraumatic.      Nose: Nose normal.      Mouth/Throat:      Mouth: Mucous membranes are moist.      Pharynx: Oropharynx is clear.   Eyes:      Conjunctiva/sclera: Conjunctivae normal.      Pupils: Pupils are equal, round, and reactive to light.   Cardiovascular:      Rate and Rhythm: Normal rate and regular rhythm.      Pulses: Normal pulses.      Heart sounds: Normal heart sounds.   Pulmonary:      Effort: Pulmonary effort is normal.      Breath sounds: Normal breath sounds.   Abdominal:      General: Abdomen is flat. Bowel sounds are normal.      Palpations: Abdomen is soft.   Musculoskeletal:         General: Normal range of motion.      Cervical back: Normal range of motion and neck supple.   Skin:     General: Skin is warm and dry.   Neurological:      General: No focal deficit present.      Mental Status: She is alert.   Psychiatric:         Mood and Affect: Mood normal.         Behavior: Behavior normal.          PAT AIRWAY:   Airway:     Mallampati::  I    Neck ROM::  Full  normal        Visit Vitals  /71   Pulse 68   Temp 36.6 °C (97.9 °F) (Temporal)   Resp 18       DASI Risk Score      Flowsheet Row Most Recent Value   DASI SCORE 28.7   METS Score (Will be calculated only when all the questions are answered) 6.3          Caprini DVT Assessment      Flowsheet Row Most Recent Value   DVT Score 6   Current Status Major surgery planned, including arthroscopic and laproscopic (1-2 hours)   History Prior major surgery   Age 60-75 years   BMI 30 or less          Modified Frailty Index    No data to display        CHADS2 Stroke Risk  Current as of 13 minutes ago        N/A 3 - 100%: High Risk   2 - 3%: Medium Risk   0 - 2%: Low Risk     Last Change: N/A          This score determines the patient's risk of having a stroke if the patient has atrial fibrillation.        This score is not applicable to this patient. Components are not calculated.          Revised Cardiac Risk Index      Flowsheet Row Most Recent Value   Revised Cardiac Risk Calculator 0          Apfel Simplified Score      Flowsheet Row Most Recent Value   Apfel Simplified Score Calculator 4          Risk Analysis Index Results This Encounter    No data found in the last 1 encounters.       Stop Bang Score      Flowsheet Row Most Recent Value   Do you snore loudly? 0   Do you often feel tired or fatigued after your sleep? 0   Has anyone ever observed you stop breathing in your sleep? 0   Do you have or are you being treated for high blood pressure? 0   Recent BMI (Calculated) 22   Is BMI greater than 35 kg/m2? 0=No   Age older than 50 years old? 1=Yes   Is your neck circumference greater than 17 inches (Male) or 16 inches (Female)? 0   Gender - Male 0=No   STOP-BANG Total Score 1          59 y/o female scheduled for hysterectomy on 23 with Dr. Cole secondary to abnormal PAP.     PMHX includes hypothyroidism: continue current meds    Anesthesia issues: PONV    H/O DVT: no     Sleep apnea: no     H/O transfusions: no    EK23 NSR, nonspecific ST abnormality.     Clearances: none    Patient verbalized understanding of preop instructions given in PAT

## 2023-12-19 ENCOUNTER — ANESTHESIA EVENT (OUTPATIENT)
Dept: OPERATING ROOM | Facility: HOSPITAL | Age: 58
End: 2023-12-19
Payer: COMMERCIAL

## 2023-12-21 ENCOUNTER — ANESTHESIA (OUTPATIENT)
Dept: OPERATING ROOM | Facility: HOSPITAL | Age: 58
End: 2023-12-21
Payer: COMMERCIAL

## 2023-12-21 ENCOUNTER — TELEPHONE (OUTPATIENT)
Dept: PRIMARY CARE | Facility: CLINIC | Age: 58
End: 2023-12-21
Payer: COMMERCIAL

## 2023-12-21 DIAGNOSIS — U07.1 COVID-19: Primary | ICD-10-CM

## 2023-12-21 RX ORDER — NIRMATRELVIR AND RITONAVIR 300-100 MG
3 KIT ORAL 2 TIMES DAILY
Qty: 30 TABLET | Refills: 0 | Status: SHIPPED | OUTPATIENT
Start: 2023-12-21 | End: 2023-12-26

## 2023-12-27 LAB
ATRIAL RATE: 65 BPM
P AXIS: 63 DEGREES
P OFFSET: 188 MS
P ONSET: 151 MS
PR INTERVAL: 146 MS
Q ONSET: 224 MS
QRS COUNT: 11 BEATS
QRS DURATION: 68 MS
QT INTERVAL: 384 MS
QTC CALCULATION(BAZETT): 399 MS
QTC FREDERICIA: 394 MS
R AXIS: 56 DEGREES
T AXIS: 24 DEGREES
T OFFSET: 416 MS
VENTRICULAR RATE: 65 BPM

## 2024-01-23 ENCOUNTER — TELEPHONE (OUTPATIENT)
Dept: PREADMISSION TESTING | Facility: HOSPITAL | Age: 59
End: 2024-01-23
Payer: COMMERCIAL

## 2024-01-23 RX ORDER — BISMUTH SUBSALICYLATE 262 MG
1 TABLET,CHEWABLE ORAL DAILY
COMMUNITY

## 2024-01-23 NOTE — TELEPHONE ENCOUNTER
Pre-procedure [facility] phone assessment completed. Pre-operative and medication instructions reviewed with patient. States she stopped her MVI last week. Patient verbalizes understanding of instructions. States she had PAT last month then had Covid and had to reschedule surgery. States she has followed all instructions given at [facility] and has started body wash procedure. .  SURGERY PRE-OPERATIVE INSTRUCTIONS    *You will receive a phone call the day before your procedure  after 2pm, (or the Friday before your surgery if scheduled on a Monday.) Generally the hospital will be calling you with this information after that time.    *You are not to eat after midnight the night before the surgery. You may have 8oz of a clear liquid up until 2 hours prior to arriving to the hospital. The exception is with medications you were instructed to take day of surgery.    *You may take tylenol for pain/discomfort as needed.     *Stop taking all aspirin products, ibuprofen (motrin/advil), naproxen (aleve/naprosyn) for one week prior to surgery.    *Stop taking all vitamins and supplements one week prior to surgery.     *You should not have alcoholic beverages for 24 hours before surgery.     *You should not smoke 24 hours prior to surgery.     *To help prevent surgical infections bathe/shower with Dial soap the evening before surgery.    *You can wear deodorant but no lotion, powder, or perfume/cologne. You should remove all make-up and nail polish at home.    *If you wear glasses, please bring a case for the glasses with you.    *You will be asked to remove dentures and contacts.     *Please leave all valuables at home.    *You should wear loose, comfortable clothing that will accommodate bandages and/or casts.    *You should notify your doctor of any change in your condition (fever, cold, rash, etc). Surgery may need to be re-scheduled until a time you are in better health.    *A responsible adult is required to accompany you to and from  the hospital if you are receiving anesthesia or a sedative. Patients are not permitted to drive for 24 hours after anesthesia.     *You can use the [employer] parking if you wish.     *If you have any further questions please call [phone].    CHG BODY WASH INSTRUCTIONS    *Begin using your CHG soap five days prior to your scheduled surgery.  Allow the CHG soap to sit on skin for 3 minutes. Do not wash with regular soap after you have used the CHG soap. Pat yourself dry with a clean, fresh towel.    *Wash your face with normal soap and water. Apply the CHG solution to a clean, wet washcloth. Firmly lather your entire body from the neck down. Do not use on your face.     *Do not apply powders, deodorants, or lotions after using CHG wash.    *Dress in clean, freshly laundered night clothes.    *Be sure to sleep with clean, freshly laundered sheets.     *Be aware CHG wash may cause stains on fabrics. Rinse your washcloth and other linens that come in contact with CHG completely. Use non-chlorine detergents to launder items used.     *The morning of surgery is the fifth day, repeat the CHG wash and wear fresh laundered clothes.     *If you have any questions about the CHG soap, call [phone].

## 2024-01-25 ENCOUNTER — HOSPITAL ENCOUNTER (OUTPATIENT)
Facility: HOSPITAL | Age: 59
LOS: 1 days | Discharge: HOME | End: 2024-01-26
Attending: OBSTETRICS & GYNECOLOGY | Admitting: OBSTETRICS & GYNECOLOGY
Payer: COMMERCIAL

## 2024-01-25 DIAGNOSIS — R87.810 CERVICAL HIGH RISK HPV (HUMAN PAPILLOMAVIRUS) TEST POSITIVE: ICD-10-CM

## 2024-01-25 DIAGNOSIS — N87.9 DYSPLASIA OF CERVIX: Primary | ICD-10-CM

## 2024-01-25 PROBLEM — T88.59XA DELAYED EMERGENCE FROM ANESTHESIA: Status: ACTIVE | Noted: 2024-01-25

## 2024-01-25 PROBLEM — R11.2 PONV (POSTOPERATIVE NAUSEA AND VOMITING): Status: ACTIVE | Noted: 2024-01-25

## 2024-01-25 PROBLEM — Z98.890 PONV (POSTOPERATIVE NAUSEA AND VOMITING): Status: ACTIVE | Noted: 2024-01-25

## 2024-01-25 LAB
ABO GROUP (TYPE) IN BLOOD: NORMAL
ANION GAP SERPL CALC-SCNC: 17 MMOL/L (ref 10–20)
ANTIBODY SCREEN: NORMAL
BUN SERPL-MCNC: 14 MG/DL (ref 6–23)
CALCIUM SERPL-MCNC: 9 MG/DL (ref 8.6–10.3)
CHLORIDE SERPL-SCNC: 102 MMOL/L (ref 98–107)
CO2 SERPL-SCNC: 26 MMOL/L (ref 21–32)
CREAT SERPL-MCNC: 0.9 MG/DL (ref 0.5–1.05)
EGFRCR SERPLBLD CKD-EPI 2021: 73 ML/MIN/1.73M*2
ERYTHROCYTE [DISTWIDTH] IN BLOOD BY AUTOMATED COUNT: 12.9 % (ref 11.5–14.5)
GLUCOSE SERPL-MCNC: 124 MG/DL (ref 74–99)
HCT VFR BLD AUTO: 38.7 % (ref 36–46)
HGB BLD-MCNC: 12.9 G/DL (ref 12–16)
MCH RBC QN AUTO: 30.3 PG (ref 26–34)
MCHC RBC AUTO-ENTMCNC: 33.3 G/DL (ref 32–36)
MCV RBC AUTO: 91 FL (ref 80–100)
NRBC BLD-RTO: 0 /100 WBCS (ref 0–0)
PLATELET # BLD AUTO: 226 X10*3/UL (ref 150–450)
POTASSIUM SERPL-SCNC: 4.7 MMOL/L (ref 3.5–5.3)
RBC # BLD AUTO: 4.26 X10*6/UL (ref 4–5.2)
RH FACTOR (ANTIGEN D): NORMAL
SODIUM SERPL-SCNC: 140 MMOL/L (ref 136–145)
WBC # BLD AUTO: 10.7 X10*3/UL (ref 4.4–11.3)

## 2024-01-25 PROCEDURE — 3700000001 HC GENERAL ANESTHESIA TIME - INITIAL BASE CHARGE: Performed by: OBSTETRICS & GYNECOLOGY

## 2024-01-25 PROCEDURE — 36415 COLL VENOUS BLD VENIPUNCTURE: CPT | Performed by: ANESTHESIOLOGY

## 2024-01-25 PROCEDURE — 2780000003 HC OR 278 NO HCPCS: Performed by: OBSTETRICS & GYNECOLOGY

## 2024-01-25 PROCEDURE — 2500000004 HC RX 250 GENERAL PHARMACY W/ HCPCS (ALT 636 FOR OP/ED): Performed by: ANESTHESIOLOGY

## 2024-01-25 PROCEDURE — 88307 TISSUE EXAM BY PATHOLOGIST: CPT | Mod: TC,SUR,GEALAB | Performed by: OBSTETRICS & GYNECOLOGY

## 2024-01-25 PROCEDURE — 36415 COLL VENOUS BLD VENIPUNCTURE: CPT | Performed by: OBSTETRICS & GYNECOLOGY

## 2024-01-25 PROCEDURE — 7100000001 HC RECOVERY ROOM TIME - INITIAL BASE CHARGE: Performed by: OBSTETRICS & GYNECOLOGY

## 2024-01-25 PROCEDURE — 1100000001 HC PRIVATE ROOM DAILY

## 2024-01-25 PROCEDURE — 86901 BLOOD TYPING SEROLOGIC RH(D): CPT | Performed by: ANESTHESIOLOGY

## 2024-01-25 PROCEDURE — 58552 LAPARO-VAG HYST INCL T/O: CPT | Performed by: OBSTETRICS & GYNECOLOGY

## 2024-01-25 PROCEDURE — 3700000002 HC GENERAL ANESTHESIA TIME - EACH INCREMENTAL 1 MINUTE: Performed by: OBSTETRICS & GYNECOLOGY

## 2024-01-25 PROCEDURE — A58552 PR LAP,VAG HYST,UTERUS 250GMS/<,SALP-OOPH: Performed by: NURSE ANESTHETIST, CERTIFIED REGISTERED

## 2024-01-25 PROCEDURE — 2720000007 HC OR 272 NO HCPCS: Performed by: OBSTETRICS & GYNECOLOGY

## 2024-01-25 PROCEDURE — 80048 BASIC METABOLIC PNL TOTAL CA: CPT | Performed by: OBSTETRICS & GYNECOLOGY

## 2024-01-25 PROCEDURE — 85027 COMPLETE CBC AUTOMATED: CPT | Performed by: OBSTETRICS & GYNECOLOGY

## 2024-01-25 PROCEDURE — A4217 STERILE WATER/SALINE, 500 ML: HCPCS | Performed by: OBSTETRICS & GYNECOLOGY

## 2024-01-25 PROCEDURE — 3600000004 HC OR TIME - INITIAL BASE CHARGE - PROCEDURE LEVEL FOUR: Performed by: OBSTETRICS & GYNECOLOGY

## 2024-01-25 PROCEDURE — 7100000002 HC RECOVERY ROOM TIME - EACH INCREMENTAL 1 MINUTE: Performed by: OBSTETRICS & GYNECOLOGY

## 2024-01-25 PROCEDURE — 3600000009 HC OR TIME - EACH INCREMENTAL 1 MINUTE - PROCEDURE LEVEL FOUR: Performed by: OBSTETRICS & GYNECOLOGY

## 2024-01-25 PROCEDURE — 2500000005 HC RX 250 GENERAL PHARMACY W/O HCPCS: Performed by: NURSE ANESTHETIST, CERTIFIED REGISTERED

## 2024-01-25 PROCEDURE — 2500000004 HC RX 250 GENERAL PHARMACY W/ HCPCS (ALT 636 FOR OP/ED): Performed by: OBSTETRICS & GYNECOLOGY

## 2024-01-25 PROCEDURE — 88307 TISSUE EXAM BY PATHOLOGIST: CPT | Performed by: PATHOLOGY

## 2024-01-25 PROCEDURE — 2500000001 HC RX 250 WO HCPCS SELF ADMINISTERED DRUGS (ALT 637 FOR MEDICARE OP): Performed by: OBSTETRICS & GYNECOLOGY

## 2024-01-25 PROCEDURE — 2500000004 HC RX 250 GENERAL PHARMACY W/ HCPCS (ALT 636 FOR OP/ED): Performed by: NURSE ANESTHETIST, CERTIFIED REGISTERED

## 2024-01-25 RX ORDER — SODIUM CHLORIDE, SODIUM LACTATE, POTASSIUM CHLORIDE, CALCIUM CHLORIDE 600; 310; 30; 20 MG/100ML; MG/100ML; MG/100ML; MG/100ML
100 INJECTION, SOLUTION INTRAVENOUS CONTINUOUS
Status: DISCONTINUED | OUTPATIENT
Start: 2024-01-25 | End: 2024-01-25 | Stop reason: HOSPADM

## 2024-01-25 RX ORDER — MIDAZOLAM HYDROCHLORIDE 1 MG/ML
INJECTION INTRAMUSCULAR; INTRAVENOUS AS NEEDED
Status: DISCONTINUED | OUTPATIENT
Start: 2024-01-25 | End: 2024-01-25

## 2024-01-25 RX ORDER — AMOXICILLIN 250 MG
2 CAPSULE ORAL 2 TIMES DAILY
Status: DISCONTINUED | OUTPATIENT
Start: 2024-01-25 | End: 2024-01-26 | Stop reason: HOSPADM

## 2024-01-25 RX ORDER — POLYETHYLENE GLYCOL 3350 17 G/17G
17 POWDER, FOR SOLUTION ORAL DAILY PRN
Status: DISCONTINUED | OUTPATIENT
Start: 2024-01-25 | End: 2024-01-26 | Stop reason: HOSPADM

## 2024-01-25 RX ORDER — PROPOFOL 10 MG/ML
INJECTION, EMULSION INTRAVENOUS AS NEEDED
Status: DISCONTINUED | OUTPATIENT
Start: 2024-01-25 | End: 2024-01-25

## 2024-01-25 RX ORDER — ACETAMINOPHEN 325 MG/1
975 TABLET ORAL ONCE
Status: COMPLETED | OUTPATIENT
Start: 2024-01-25 | End: 2024-01-25

## 2024-01-25 RX ORDER — CEFAZOLIN 1 G/1
INJECTION, POWDER, FOR SOLUTION INTRAVENOUS AS NEEDED
Status: DISCONTINUED | OUTPATIENT
Start: 2024-01-25 | End: 2024-01-25

## 2024-01-25 RX ORDER — ONDANSETRON HYDROCHLORIDE 2 MG/ML
4 INJECTION, SOLUTION INTRAVENOUS ONCE AS NEEDED
Status: DISCONTINUED | OUTPATIENT
Start: 2024-01-25 | End: 2024-01-25 | Stop reason: HOSPADM

## 2024-01-25 RX ORDER — TRAMADOL HYDROCHLORIDE 50 MG/1
50 TABLET ORAL EVERY 6 HOURS PRN
Status: DISCONTINUED | OUTPATIENT
Start: 2024-01-25 | End: 2024-01-26 | Stop reason: HOSPADM

## 2024-01-25 RX ORDER — GABAPENTIN 300 MG/1
600 CAPSULE ORAL ONCE
Status: COMPLETED | OUTPATIENT
Start: 2024-01-25 | End: 2024-01-25

## 2024-01-25 RX ORDER — DROPERIDOL 2.5 MG/ML
0.62 INJECTION, SOLUTION INTRAMUSCULAR; INTRAVENOUS ONCE AS NEEDED
Status: DISCONTINUED | OUTPATIENT
Start: 2024-01-25 | End: 2024-01-25 | Stop reason: HOSPADM

## 2024-01-25 RX ORDER — ROCURONIUM BROMIDE 10 MG/ML
INJECTION, SOLUTION INTRAVENOUS AS NEEDED
Status: DISCONTINUED | OUTPATIENT
Start: 2024-01-25 | End: 2024-01-25

## 2024-01-25 RX ORDER — SODIUM CHLORIDE, SODIUM LACTATE, POTASSIUM CHLORIDE, CALCIUM CHLORIDE 600; 310; 30; 20 MG/100ML; MG/100ML; MG/100ML; MG/100ML
100 INJECTION, SOLUTION INTRAVENOUS CONTINUOUS
Status: DISCONTINUED | OUTPATIENT
Start: 2024-01-25 | End: 2024-01-26 | Stop reason: HOSPADM

## 2024-01-25 RX ORDER — PROPOFOL 10 MG/ML
INJECTION, EMULSION INTRAVENOUS CONTINUOUS PRN
Status: DISCONTINUED | OUTPATIENT
Start: 2024-01-25 | End: 2024-01-25

## 2024-01-25 RX ORDER — LEVOTHYROXINE SODIUM 75 UG/1
75 TABLET ORAL
Status: DISCONTINUED | OUTPATIENT
Start: 2024-01-26 | End: 2024-01-26 | Stop reason: HOSPADM

## 2024-01-25 RX ORDER — FENTANYL CITRATE 50 UG/ML
INJECTION, SOLUTION INTRAMUSCULAR; INTRAVENOUS AS NEEDED
Status: DISCONTINUED | OUTPATIENT
Start: 2024-01-25 | End: 2024-01-25

## 2024-01-25 RX ORDER — SIMETHICONE 80 MG
80 TABLET,CHEWABLE ORAL 4 TIMES DAILY PRN
Status: DISCONTINUED | OUTPATIENT
Start: 2024-01-25 | End: 2024-01-26 | Stop reason: HOSPADM

## 2024-01-25 RX ORDER — METHYLENE BLUE 5 MG/ML
INJECTION INTRAVENOUS AS NEEDED
Status: DISCONTINUED | OUTPATIENT
Start: 2024-01-25 | End: 2024-01-25

## 2024-01-25 RX ORDER — ONDANSETRON HYDROCHLORIDE 2 MG/ML
4 INJECTION, SOLUTION INTRAVENOUS EVERY 6 HOURS PRN
Status: DISCONTINUED | OUTPATIENT
Start: 2024-01-25 | End: 2024-01-26 | Stop reason: HOSPADM

## 2024-01-25 RX ORDER — ONDANSETRON HYDROCHLORIDE 2 MG/ML
INJECTION, SOLUTION INTRAVENOUS AS NEEDED
Status: DISCONTINUED | OUTPATIENT
Start: 2024-01-25 | End: 2024-01-25

## 2024-01-25 RX ORDER — LIDOCAINE HCL/PF 100 MG/5ML
SYRINGE (ML) INTRAVENOUS AS NEEDED
Status: DISCONTINUED | OUTPATIENT
Start: 2024-01-25 | End: 2024-01-25

## 2024-01-25 RX ORDER — CELECOXIB 400 MG/1
400 CAPSULE ORAL ONCE
Status: DISCONTINUED | OUTPATIENT
Start: 2024-01-25 | End: 2024-01-25 | Stop reason: HOSPADM

## 2024-01-25 RX ORDER — SODIUM CHLORIDE 0.9 G/100ML
IRRIGANT IRRIGATION AS NEEDED
Status: DISCONTINUED | OUTPATIENT
Start: 2024-01-25 | End: 2024-01-25 | Stop reason: HOSPADM

## 2024-01-25 RX ORDER — SCOLOPAMINE TRANSDERMAL SYSTEM 1 MG/1
1 PATCH, EXTENDED RELEASE TRANSDERMAL ONCE
Status: DISCONTINUED | OUTPATIENT
Start: 2024-01-25 | End: 2024-01-25

## 2024-01-25 RX ORDER — ENOXAPARIN SODIUM 100 MG/ML
40 INJECTION SUBCUTANEOUS EVERY 24 HOURS
Status: COMPLETED | OUTPATIENT
Start: 2024-01-25 | End: 2024-01-25

## 2024-01-25 RX ORDER — HYDROMORPHONE HYDROCHLORIDE 2 MG/ML
INJECTION, SOLUTION INTRAMUSCULAR; INTRAVENOUS; SUBCUTANEOUS AS NEEDED
Status: DISCONTINUED | OUTPATIENT
Start: 2024-01-25 | End: 2024-01-25

## 2024-01-25 RX ORDER — NALOXONE HYDROCHLORIDE 0.4 MG/ML
0.1 INJECTION, SOLUTION INTRAMUSCULAR; INTRAVENOUS; SUBCUTANEOUS EVERY 5 MIN PRN
Status: DISCONTINUED | OUTPATIENT
Start: 2024-01-25 | End: 2024-01-26 | Stop reason: HOSPADM

## 2024-01-25 RX ORDER — SODIUM CHLORIDE, SODIUM LACTATE, POTASSIUM CHLORIDE, CALCIUM CHLORIDE 600; 310; 30; 20 MG/100ML; MG/100ML; MG/100ML; MG/100ML
40 INJECTION, SOLUTION INTRAVENOUS CONTINUOUS
Status: DISCONTINUED | OUTPATIENT
Start: 2024-01-25 | End: 2024-01-26 | Stop reason: HOSPADM

## 2024-01-25 RX ORDER — OXYCODONE HYDROCHLORIDE 5 MG/1
5 TABLET ORAL EVERY 4 HOURS PRN
Status: DISCONTINUED | OUTPATIENT
Start: 2024-01-25 | End: 2024-01-25 | Stop reason: HOSPADM

## 2024-01-25 RX ORDER — DEXAMETHASONE SODIUM PHOSPHATE 4 MG/ML
INJECTION, SOLUTION INTRA-ARTICULAR; INTRALESIONAL; INTRAMUSCULAR; INTRAVENOUS; SOFT TISSUE AS NEEDED
Status: DISCONTINUED | OUTPATIENT
Start: 2024-01-25 | End: 2024-01-25

## 2024-01-25 RX ORDER — OXYCODONE HYDROCHLORIDE 5 MG/1
5 TABLET ORAL EVERY 4 HOURS PRN
Status: DISCONTINUED | OUTPATIENT
Start: 2024-01-25 | End: 2024-01-26 | Stop reason: HOSPADM

## 2024-01-25 RX ADMIN — PROPOFOL 140 MG: 10 INJECTION, EMULSION INTRAVENOUS at 10:29

## 2024-01-25 RX ADMIN — HYDROMORPHONE HYDROCHLORIDE 0.4 MG: 2 INJECTION, SOLUTION INTRAMUSCULAR; INTRAVENOUS; SUBCUTANEOUS at 11:40

## 2024-01-25 RX ADMIN — ONDANSETRON 4 MG: 2 INJECTION, SOLUTION INTRAMUSCULAR; INTRAVENOUS at 12:03

## 2024-01-25 RX ADMIN — SODIUM CHLORIDE, POTASSIUM CHLORIDE, SODIUM LACTATE AND CALCIUM CHLORIDE: 600; 310; 30; 20 INJECTION, SOLUTION INTRAVENOUS at 12:07

## 2024-01-25 RX ADMIN — ROCURONIUM BROMIDE 20 MG: 10 INJECTION, SOLUTION INTRAVENOUS at 11:09

## 2024-01-25 RX ADMIN — GABAPENTIN 300 MG: 300 CAPSULE ORAL at 08:54

## 2024-01-25 RX ADMIN — LIDOCAINE HYDROCHLORIDE 50 MG: 20 INJECTION, SOLUTION INTRAVENOUS at 10:29

## 2024-01-25 RX ADMIN — HYDROMORPHONE HYDROCHLORIDE 0.2 MG: 2 INJECTION, SOLUTION INTRAMUSCULAR; INTRAVENOUS; SUBCUTANEOUS at 11:20

## 2024-01-25 RX ADMIN — SENNOSIDES AND DOCUSATE SODIUM 2 TABLET: 8.6; 5 TABLET ORAL at 20:23

## 2024-01-25 RX ADMIN — SODIUM CHLORIDE, POTASSIUM CHLORIDE, SODIUM LACTATE AND CALCIUM CHLORIDE 40 ML/HR: 600; 310; 30; 20 INJECTION, SOLUTION INTRAVENOUS at 18:27

## 2024-01-25 RX ADMIN — FENTANYL CITRATE 25 MCG: 50 INJECTION, SOLUTION INTRAMUSCULAR; INTRAVENOUS at 10:22

## 2024-01-25 RX ADMIN — ACETAMINOPHEN 975 MG: 325 TABLET ORAL at 08:54

## 2024-01-25 RX ADMIN — FENTANYL CITRATE 50 MCG: 50 INJECTION, SOLUTION INTRAMUSCULAR; INTRAVENOUS at 10:29

## 2024-01-25 RX ADMIN — ROCURONIUM BROMIDE 10 MG: 10 INJECTION, SOLUTION INTRAVENOUS at 11:50

## 2024-01-25 RX ADMIN — SUGAMMADEX 200 MG: 100 INJECTION, SOLUTION INTRAVENOUS at 12:32

## 2024-01-25 RX ADMIN — MIDAZOLAM HYDROCHLORIDE 1 MG: 1 INJECTION, SOLUTION INTRAMUSCULAR; INTRAVENOUS at 10:22

## 2024-01-25 RX ADMIN — FENTANYL CITRATE 25 MCG: 50 INJECTION, SOLUTION INTRAMUSCULAR; INTRAVENOUS at 11:04

## 2024-01-25 RX ADMIN — ROCURONIUM BROMIDE 50 MG: 10 INJECTION, SOLUTION INTRAVENOUS at 10:29

## 2024-01-25 RX ADMIN — DEXAMETHASONE SODIUM PHOSPHATE 8 MG: 4 INJECTION INTRA-ARTICULAR; INTRALESIONAL; INTRAMUSCULAR; INTRAVENOUS; SOFT TISSUE at 10:29

## 2024-01-25 RX ADMIN — ONDANSETRON 4 MG: 2 INJECTION, SOLUTION INTRAMUSCULAR; INTRAVENOUS at 10:29

## 2024-01-25 RX ADMIN — HYDROMORPHONE HYDROCHLORIDE 0.4 MG: 2 INJECTION, SOLUTION INTRAMUSCULAR; INTRAVENOUS; SUBCUTANEOUS at 10:53

## 2024-01-25 RX ADMIN — SCOPOLAMINE 1 PATCH: 1.5 PATCH, EXTENDED RELEASE TRANSDERMAL at 08:58

## 2024-01-25 RX ADMIN — METHYLENE BLUE 25 MG: 5 INJECTION INTRAVENOUS at 10:36

## 2024-01-25 RX ADMIN — ENOXAPARIN SODIUM 40 MG: 40 INJECTION SUBCUTANEOUS at 20:22

## 2024-01-25 RX ADMIN — SODIUM CHLORIDE, POTASSIUM CHLORIDE, SODIUM LACTATE AND CALCIUM CHLORIDE 100 ML/HR: 600; 310; 30; 20 INJECTION, SOLUTION INTRAVENOUS at 08:52

## 2024-01-25 RX ADMIN — PROPOFOL 25 MCG/KG/MIN: 10 INJECTION, EMULSION INTRAVENOUS at 11:13

## 2024-01-25 RX ADMIN — CEFAZOLIN 2 G: 330 INJECTION, POWDER, FOR SOLUTION INTRAMUSCULAR; INTRAVENOUS at 10:27

## 2024-01-25 SDOH — SOCIAL STABILITY: SOCIAL INSECURITY: WERE YOU ABLE TO COMPLETE ALL THE BEHAVIORAL HEALTH SCREENINGS?: YES

## 2024-01-25 SDOH — SOCIAL STABILITY: SOCIAL INSECURITY: DO YOU FEEL UNSAFE GOING BACK TO THE PLACE WHERE YOU ARE LIVING?: NO

## 2024-01-25 SDOH — SOCIAL STABILITY: SOCIAL INSECURITY: DOES ANYONE TRY TO KEEP YOU FROM HAVING/CONTACTING OTHER FRIENDS OR DOING THINGS OUTSIDE YOUR HOME?: NO

## 2024-01-25 SDOH — SOCIAL STABILITY: SOCIAL INSECURITY: ARE THERE ANY APPARENT SIGNS OF INJURIES/BEHAVIORS THAT COULD BE RELATED TO ABUSE/NEGLECT?: NO

## 2024-01-25 SDOH — HEALTH STABILITY: MENTAL HEALTH: CURRENT SMOKER: 0

## 2024-01-25 SDOH — SOCIAL STABILITY: SOCIAL INSECURITY: ARE YOU OR HAVE YOU BEEN THREATENED OR ABUSED PHYSICALLY, EMOTIONALLY, OR SEXUALLY BY ANYONE?: NO

## 2024-01-25 SDOH — SOCIAL STABILITY: SOCIAL INSECURITY: HAVE YOU HAD THOUGHTS OF HARMING ANYONE ELSE?: NO

## 2024-01-25 SDOH — SOCIAL STABILITY: SOCIAL INSECURITY: HAS ANYONE EVER THREATENED TO HURT YOUR FAMILY OR YOUR PETS?: NO

## 2024-01-25 SDOH — SOCIAL STABILITY: SOCIAL INSECURITY: DO YOU FEEL ANYONE HAS EXPLOITED OR TAKEN ADVANTAGE OF YOU FINANCIALLY OR OF YOUR PERSONAL PROPERTY?: NO

## 2024-01-25 SDOH — SOCIAL STABILITY: SOCIAL INSECURITY: ABUSE: ADULT

## 2024-01-25 ASSESSMENT — PAIN - FUNCTIONAL ASSESSMENT
PAIN_FUNCTIONAL_ASSESSMENT: 0-10
PAIN_FUNCTIONAL_ASSESSMENT: UNABLE TO SELF-REPORT
PAIN_FUNCTIONAL_ASSESSMENT: 0-10
PAIN_FUNCTIONAL_ASSESSMENT: UNABLE TO SELF-REPORT

## 2024-01-25 ASSESSMENT — ACTIVITIES OF DAILY LIVING (ADL)
HEARING - RIGHT EAR: FUNCTIONAL
PATIENT'S MEMORY ADEQUATE TO SAFELY COMPLETE DAILY ACTIVITIES?: YES
ASSISTIVE_DEVICE: EYEGLASSES
LACK_OF_TRANSPORTATION: NO
DRESSING YOURSELF: INDEPENDENT
JUDGMENT_ADEQUATE_SAFELY_COMPLETE_DAILY_ACTIVITIES: YES
FEEDING YOURSELF: INDEPENDENT
GROOMING: INDEPENDENT
ADEQUATE_TO_COMPLETE_ADL: YES
TOILETING: INDEPENDENT
HEARING - LEFT EAR: FUNCTIONAL
BATHING: INDEPENDENT
WALKS IN HOME: INDEPENDENT

## 2024-01-25 ASSESSMENT — COGNITIVE AND FUNCTIONAL STATUS - GENERAL
DAILY ACTIVITIY SCORE: 24
PATIENT BASELINE BEDBOUND: NO
MOBILITY SCORE: 24
DAILY ACTIVITIY SCORE: 24
MOBILITY SCORE: 24

## 2024-01-25 ASSESSMENT — LIFESTYLE VARIABLES
SKIP TO QUESTIONS 9-10: 1
AUDIT-C TOTAL SCORE: 1
HOW MANY STANDARD DRINKS CONTAINING ALCOHOL DO YOU HAVE ON A TYPICAL DAY: 1 OR 2
PRESCIPTION_ABUSE_PAST_12_MONTHS: NO
SUBSTANCE_ABUSE_PAST_12_MONTHS: NO
HOW OFTEN DO YOU HAVE 6 OR MORE DRINKS ON ONE OCCASION: NEVER
AUDIT-C TOTAL SCORE: 1
HOW OFTEN DO YOU HAVE A DRINK CONTAINING ALCOHOL: MONTHLY OR LESS

## 2024-01-25 ASSESSMENT — PATIENT HEALTH QUESTIONNAIRE - PHQ9
2. FEELING DOWN, DEPRESSED OR HOPELESS: NOT AT ALL
1. LITTLE INTEREST OR PLEASURE IN DOING THINGS: NOT AT ALL
SUM OF ALL RESPONSES TO PHQ9 QUESTIONS 1 & 2: 0

## 2024-01-25 ASSESSMENT — PAIN SCALES - GENERAL
PAINLEVEL_OUTOF10: 3
PAINLEVEL_OUTOF10: 0 - NO PAIN

## 2024-01-25 ASSESSMENT — COLUMBIA-SUICIDE SEVERITY RATING SCALE - C-SSRS
1. IN THE PAST MONTH, HAVE YOU WISHED YOU WERE DEAD OR WISHED YOU COULD GO TO SLEEP AND NOT WAKE UP?: NO
2. HAVE YOU ACTUALLY HAD ANY THOUGHTS OF KILLING YOURSELF?: NO
6. HAVE YOU EVER DONE ANYTHING, STARTED TO DO ANYTHING, OR PREPARED TO DO ANYTHING TO END YOUR LIFE?: NO

## 2024-01-25 NOTE — H&P
History Of Present Illness  Cheryl Wyatt is a 58 y.o. female presenting with    Mitul Cole MD  Physician  Obstetrics     Progress Notes     Signed     Encounter Date: 11/21/2023     Signed       Expand All Collapse All       Subjective   Patient ID: Cheryl Wyatt is a 58 y.o. female who presents for Preop consult.     A.  THINPREP PAP ENDOCERVICAL BRUSH:         Specimen adequacy:  SATISFACTORY FOR EVALUATION.  Quality Indicator: Endocervical/transformation zone component is present.         General Categorization:   EPITHELIAL CELL ABNORMALITY - SQUAMOUS CELL.   See Interpretation.         Descriptive Interpretation:  LOW GRADE SQUAMOUS INTRAEPITHELIAL LESION (LSIL) - CERVIX.  ATYPICAL SQUAMOUS CELLS, CANNOT EXCLUDE HIGH GRADE SQUAMOUS INTRAEPITHELIAL  LESION (ASC-H) - CERVIX.     Ancillary Testing:  Specimen does not meet the requisition-stated criteria for HPV testing. See Pap  test interpretation above.        The gross and/or microscopic findings were reviewed in conjunction with  pathology resident, TREVIN Iraheta.     tus: Final result       Visible to patient: Yes (seen)       Next appt: 03/25/2024 at 10:30 AM in Primary Care (Danii Cortez DO)       Dx: Atypical squamous cells cannot exclud...    2 Result Notes       1  Topic                Component  Ref Range & Units         HPV, high-risk  Negative          Positive Abnormal   HPV Type 16 DNA  Negative          Negative  HPV Type 18 DNA  Negative          Negative  HPV non-Type 16 or 18 DNA  Negative          Positive Abnormal   Resulting Agency        [facility]              Narrative  Performed by: [facility]      Testing for high-risk (HR) types of human papilloma virus (HPV) is performed by the Roche courtney HPV Test. The courtney HPV Test is a qualitative polymerase chain reaction that amplifies DNA of HPV16, HPV18, and 12 other high-risk HPV types (31, 33, 35, 39, 45, 51, 52, 56, 58, 59, 66, and 68) associated with cervical cancer and its precursor  lesions. A positive result indicates the presence of HPV DNA due to one or more of the 14 genotypes: 16, 18, 31, 33, 35, 39, 45, 51, 52, 56, 58, 59, 66, and 68. Negative results indicates HPV DNA concentrations are undectectable or below the pre-set threshold for detection. False negative results may be associated with unoptimized sampling. A negative HR HPV result does not exclude the possibility of future cytologic HSIL or underlying CIN2-3 or cancer.   This test is approved by the US Food and Drug Administration. Results of this test should be interpreted in conjunction with the patient Pap test results. Please refer to ASCCP current quidelines for the use of HPV DNA testing, result interpretation, and patient management.   The performance of this test was verified by the [facility] at [facility]. The lab is certified under the Clinical Laboratory Amendments of 1988 (CLIA 88) as qualified to perform high complexity clinical laboratory testing.     PERFORMING LAB LOCATIONS  [facility]: [facility] [place] [address]     Specimen Collected: 10/14/23 13:29  Last Resulted: 10/26/23 12:41              Established patient 58 years old.  She had 2 previous LEEP procedures.  Recent Pap shows low-grade changes but cannot rule out high-grade.  There is positive high risk HPV.  We have discussed in the past that if she has recurrence of abnormal Paps she is interested in hysterectomy     Past surgery includes an anterior approach to a spinal fusion.  She has a midline incision from the umbilicus on down to the pubic bone.  She also has a scar on the low back.     Current medications include Synthroid.  Allergic to Flexeril gets itchy and jittery.  Mother had a hysterectomy and a bladder lift.  Grandmother with uterine cancer.     Non-smoker.     Would recommend laparoscopic assisted hysterectomy with removal of both tubes and ovaries.  We would start in the left upper  quadrant because of her midline incision.  Will review the surgery risk benefits complications and recovery.           Review of Systems   All other systems reviewed and are negative.              Objective   cervix flush with the vagina.  Uterus mobile.  No adnexal masses midline incision from anterior approach to spinal fusion           Assessment/Plan   Left upper quadrant start.  Laparoscopic-assisted vaginal hysterectomy bilateral salpingo-oophorectomy cystoscop            .     Past Medical History  Past Medical History:   Diagnosis Date    Cervical dysplasia     Cervical high risk HPV (human papillomavirus) test positive     COVID-19     12/20/23    Delayed emergence from general anesthesia     History of Graves' disease     Hypothyroidism     Lymphadenopathy     PONV (postoperative nausea and vomiting)     Raynaud's disease     Wears glasses        Surgical History  Past Surgical History:   Procedure Laterality Date    ANTERIOR SPINAL FUSION  2008    L5-S1    CERVICAL BIOPSY  W/ LOOP ELECTRODE EXCISION      x2    KNEE ARTHROSCOPY W/ DEBRIDEMENT Right     LUMBAR DISCECTOMY  2008    L4-5    LYMPH NODE BIOPSY      SHOULDER Right     flap repair    US GUIDED MEDIASTINUM PERCUTANEOUS BIOPSY  10/14/2021    US GUIDED MEDIASTINUM PERCUTANEOUS BIOPSY 10/14/2021 [facility]        Social History  She reports that she has never smoked. She has never been exposed to tobacco smoke. She has never used smokeless tobacco. She reports current alcohol use. She reports that she does not use drugs.    Family History  Family History   Problem Relation Name Age of Onset    Stroke Mother          s/p surgery    Brain Aneurysm Mother  88    Other (cardiac valve replacement) Father      Coronary artery disease Father      Coronary artery disease Sister      Rheum arthritis Sister      Coronary artery disease Brother      Uterine cancer Paternal Grandmother          Allergies  Cyclobenzaprine    Review of Systems     Physical  "Exam     Last Recorded Vitals  Blood pressure 111/51, pulse 72, temperature 36.5 °C (97.7 °F), resp. rate 16, height 1.575 m (5' 2\"), weight 54 kg (119 lb 0.8 oz), SpO2 100 %.    Relevant Results             Assessment/Plan   Principal Problem:    Dysplasia of cervix  Active Problems:    Cervical high risk HPV (human papillomavirus) test positive      Laparoscopic assisted vaginal hysterectomy bilateral salpingo oophorectomy otoscopy.  Left upper quadrant start.  Reviewed surgery risk benefits complications recovery.        Mitul Cole MD    "

## 2024-01-25 NOTE — ANESTHESIA POSTPROCEDURE EVALUATION
Patient: Cheryl Wyatt    Procedure Summary       Date: 01/25/24 Room / Location: [facility] OR 07 / [facility]    Anesthesia Start: 1019 Anesthesia Stop: 1243    Procedures:       HYSTERECTOMY TRANSVAGINAL LAPAROSCOPY-ASSISTED WITH SALPINGO-OOPHORECTOMY (Uterus)      CYSTOSCOPY RIGID (Vagina ) Diagnosis:       Dysplasia of cervix      Cervical high risk HPV (human papillomavirus) test positive      (Dysplasia of cervix [N87.9])      (Cervical high risk HPV (human papillomavirus) test positive [R87.810])    Surgeons: Mitul Cole MD Responsible Provider: ROMEO Simms    Anesthesia Type: general ASA Status: 2            Anesthesia Type: general    Vitals Value Taken Time   /69 01/25/24 1310   Temp  01/25/24 1327   Pulse 59 01/25/24 1310   Resp 15 01/25/24 1310   SpO2 100 % 01/25/24 1259       Anesthesia Post Evaluation    Patient location during evaluation: PACU  Patient participation: complete - patient participated  Level of consciousness: awake  Pain management: adequate  Airway patency: patent  Cardiovascular status: acceptable  Respiratory status: acceptable  Hydration status: acceptable  Postoperative Nausea and Vomiting: none  Comments: Cramping pain from laparoscopic procedure, being treated with hot packs and pain meds.      No notable events documented.

## 2024-01-25 NOTE — OP NOTE
Date: 2024  OR Location: [facility] OR    Name: Cheryl Wyatt, : 1965, Age: 58 y.o., MRN: [id number], Sex: female    Diagnosis  Pre-op Diagnosis     * Dysplasia of cervix [N87.9]     * Cervical high risk HPV (human papillomavirus) test positive [R87.810] Post-op Diagnosis     * Dysplasia of cervix [N87.9]     * Cervical high risk HPV (human papillomavirus) test positive [R87.810]     Procedures  HYSTERECTOMY TRANSVAGINAL LAPAROSCOPY-ASSISTED WITH SALPINGO-OOPHORECTOMY  16290 - IL LAPS W/VAG HYSTERECT 250 GM/&RMVL TUBE&/OVARIES    CYSTOSCOPY RIGID  85143 - IL CYSTOURETHROSCOPY  2024 [facility]    Diagnoses recurrent dysplasia cervix postop the same.  Procedure was laparoscopic assisted vaginal hysterectomy bilateral salpingo oophorectomy cystoscopy Surgeon myself General anesthetic    Under general anesthetic lithotomy position patient prepped draped usual manner inner catheterization done in the bladder.  The cervix was flush with the vagina/sponge stick was patient vagina    Was emptied the left upper quadrant start 2 fingerbreadths below the costal margin midclavicular line Veress needle inserted peritoneal cavity insufflated 5 mm trocar inserted laparoscope used to visualize the pelvis there were no adhesions from prior surgery so primary port was made infraumbilically.  Additional 5 mm ports placed suprapubically and left lower quadrant.  Ovaries atrophic.  Uterus visualized.  Lifting up both adnexa at the infundibulopelvic ligaments round ligaments and uterine vessels were clamped incised and ligated using the LigaSure device    The patient was then repositioned prepped and draped in [facility].  Limited vaginal access as she was nongravid and vagina was atrophic.  Speculum placed posteriorly Deavers anteriorly and laterally.  2 single-tooth tenaculum used to grasp what was left of the cervix and a circumferential incision made around the cervical vaginal Koza initially  posterior cul-de-sac entered and uterosacral and cardinal ligaments clamped incised and ligated.  Then anterior cul-de-sac entered uterine vessels clamped incised and ligated fundus inverted and remained parametrium clamped incised ligated extremities uterus both tubes and ovaries.  Angles of the vault were then closed using interrupted 2-0 Vicryl suture incorporating the uterosacral cardinal pedicles.  Posterior cuff closed.  Hemostasis excellent all sponges removed sponge instrument counts correct.  Vaginal vault closed anterior to posterior with rapid 2-0 Vicryl suture Nupathe cystoscopy performed.  No evidence of bladder injury.  Urine flow for both ureteric orifices and patient had received methylene blue intravenously    Cystoscope removed Alvarez catheter inserted to the bladder.  Revisited the laparoscopic view.  Minimal blood in the cul-de-sac.  Pelvis irrigated and suction.  Hemostasis excellent.  Floseal placed along the pelvic floor suture line.  Peritoneal cavity allowed to exsufflated.  All instruments removed.  Skin incisions reapproximated and transferred to recovery room in stable condition.    Surgeons      * Mitul Cole - Primary    Resident/Fellow/Other Assistant:  Surgeon(s) and Role:    Procedure Summary  Anesthesia: Consult  ASA: II  Anesthesia Staff: CRNA: JOHN Simms-CRNA  Estimated Blood Loss: 150mL  Intra-op Medications:   Administrations occurring from 0930 to 1130 on 01/25/24:   Medication Name Total Dose   sodium chloride 0.9 % irrigation solution 1,000 mL              Anesthesia Record               Intraprocedure I/O Totals          Intake    Propofol Drip 0.00 mL    The total shown is the total volume documented since Anesthesia Start was filed.    lactated Ringer's infusion 1200.00 mL    Total Intake 1200 mL       Output    Est. Blood Loss 150 mL    Total Output 150 mL       Net    Net Volume 1050 mL          Specimen:   ID Type Source Tests Collected by Time   1 : UTERUS,  BILATERAL FALLOPIAN TUBES, AND BILATERAL OVARIES Tissue UTERUS, CERVIX, FALLOPIAN TUBES AND OVARIES BILATERAL SURGICAL PATHOLOGY EXAM Heidy Olivas RN 1/25/2024 1350        Staff:   Circulator: Heidy Olivas RN  Relief Circulator: Estelle Funk RN  Scrub Person: Sandra Hauser RN; Savanna Louis RN         Procedure Details:  The patient was seen in the preoperative area. The site of surgery was properly noted/marked if necessary per policy. The patient has been actively warmed in preoperative area. Preoperative antibiotics have been ordered and given within 1 hours of incision. Venous thrombosis prophylaxis have been ordered including bilateral sequential compression devices    Findings: Atrophic adnexa.  Normal uterus.  Cervix flush with the vagina    Complications:  None; patient tolerated the procedure well.     Disposition: PACU - hemodynamically stable.  Condition: stable

## 2024-01-25 NOTE — NURSING NOTE
"14:47 Pt arrived to unit with  at bedside. Pt. resting and verbally reports she is \"doing very well.\" Pt. breathing even, unlabored breaths. This nurse assumed care of pt.    1700 Spoke with Dr. Cole and updated him that pt. Has stable VS, adequate urine output of 150 since arriving to unit. Per Dr. Cole, labs are to be drawn, farooq removed, and pt. to ambulate as soon as possiblee  "

## 2024-01-25 NOTE — ANESTHESIA PROCEDURE NOTES
Airway  Date/Time: 1/25/2024 10:33 AM  Urgency: elective    Airway not difficult    Staffing  Performed: CRNA   Authorized by: ROMEO Simms    Performed by: ROMEO Simms  Patient location during procedure: OR    Indications and Patient Condition  Indications for airway management: anesthesia and airway protection  Spontaneous Ventilation: absent  Preoxygenated: yes  Mask difficulty assessment: 1 - vent by mask  Planned trial extubation    Final Airway Details  Final airway type: endotracheal airway      Successful airway: ETT  Cuffed: yes   Successful intubation technique: video laryngoscopy (Ortega #3)  Facilitating devices/methods: intubating stylet  Endotracheal tube insertion site: oral  Blade size: #3  ETT size (mm): 7.0  Cormack-Lehane Classification: grade I - full view of glottis  Placement verified by: chest auscultation, capnometry and palpation of cuff   Measured from: lips  ETT to lips (cm): 21  Number of attempts at approach: 1    Additional Comments  Easy intubation.  Teeth and lips in preanesthetic condition.

## 2024-01-25 NOTE — ANESTHESIA PREPROCEDURE EVALUATION
Patient: Cheryl Wyatt    Procedure Information       Date/Time: 01/25/24 8800    Procedures:       HYSTERECTOMY TRANSVAGINAL LAPAROSCOPY-ASSISTED WITH SALPINGO-OOPHORECTOMY      CYSTOSCOPY RIGID    Location: [facility] / [facility]    Surgeons: Mitul Cole MD            Relevant Problems   Anesthesia   (+) Delayed emergence from anesthesia   (+) PONV (postoperative nausea and vomiting)      Cardiovascular   (+) Hyperlipidemia      Endocrine   (+) Hypothyroidism      GI (within normal limits)      /Renal (within normal limits)      Neuro/Psych (within normal limits)      Pulmonary (within normal limits)      GI/Hepatic (within normal limits)      Hematology (within normal limits)     Vitals:    01/25/24 0805   BP: (!) 111/41   Pulse: 72   Resp: 16   Temp: 36.5 °C (97.7 °F)   SpO2: 100%       Past Surgical History:   Procedure Laterality Date    ANTERIOR SPINAL FUSION  2008    L5-S1    CERVICAL BIOPSY  W/ LOOP ELECTRODE EXCISION      x2    KNEE ARTHROSCOPY W/ DEBRIDEMENT Right     LUMBAR DISCECTOMY  2008    L4-5    LYMPH NODE BIOPSY      SHOULDER Right     flap repair    US GUIDED MEDIASTINUM PERCUTANEOUS BIOPSY  10/14/2021    US GUIDED MEDIASTINUM PERCUTANEOUS BIOPSY 10/14/2021 [facility]     Past Medical History:   Diagnosis Date    Cervical dysplasia     Cervical high risk HPV (human papillomavirus) test positive     COVID-19     12/20/23    Delayed emergence from general anesthesia     History of Graves' disease     Hypothyroidism     Lymphadenopathy     PONV (postoperative nausea and vomiting)     Raynaud's disease     Wears glasses        Current Facility-Administered Medications:     acetaminophen (Tylenol) tablet 975 mg, 975 mg, oral, Once, Mitul Cole MD    celecoxib (CeleBREX) capsule 400 mg, 400 mg, oral, Once, Mitul Cole MD    gabapentin (Neurontin) capsule 600 mg, 600 mg, oral, Once, Mitul Cole MD    lactated Ringer's infusion, 100 mL/hr, intravenous, Continuous, Dilip Simpson  "MD  Prior to Admission medications    Medication Sig Start Date End Date Taking? Authorizing Provider   multivitamin tablet Take 1 tablet by mouth once daily.   Yes Historical Provider, MD   Synthroid 75 mcg tablet TAKE 1 TABLET BY MOUTH ONCE DAILY IN THE MORNING 1 HOUR BEFORE BREAKFAST 3/8/23  Yes Danii Cortez DO   Imvexxy Maintenance Pack 4 mcg insert INSERT 4 MCG AS DIRECTED TWICE WEEKLY 5/16/23   Historical Provider, MD     Allergies   Allergen Reactions    Cyclobenzaprine Itching     Social History     Tobacco Use    Smoking status: Never     Passive exposure: Never    Smokeless tobacco: Never   Substance Use Topics    Alcohol use: Yes     Comment: once weekly beer         Chemistry    Lab Results   Component Value Date/Time     12/12/2023 1458    K 4.3 12/12/2023 1458     12/12/2023 1458    CO2 31 12/12/2023 1458    BUN 11 12/12/2023 1458    CREATININE 0.89 12/12/2023 1458    Lab Results   Component Value Date/Time    CALCIUM 9.3 12/12/2023 1458    ALKPHOS 75 11/13/2023 0753    AST 20 11/13/2023 0753    ALT 12 11/13/2023 0753    BILITOT 0.5 11/13/2023 0753          Lab Results   Component Value Date/Time    WBC 5.7 12/12/2023 1458    HGB 12.9 12/12/2023 1458    HCT 39.6 12/12/2023 1458     12/12/2023 1458     No results found for: \"PROTIME\", \"PTT\", \"INR\"  Encounter Date: 12/12/23   ECG 12 Lead   Result Value    Ventricular Rate 65    Atrial Rate 65    GA Interval 146    QRS Duration 68    QT Interval 384    QTC Calculation(Bazett) 399    P Axis 63    R Axis 56    T Axis 24    QRS Count 11    Q Onset 224    P Onset 151    P Offset 188    T Offset 416    QTC Fredericia 394    Narrative    Normal sinus rhythm  Nonspecific ST abnormality  Abnormal ECG  When compared with ECG of 15-FEB-2022 10:20,  Nonspecific T wave abnormality now evident in Inferior leads  Confirmed by Doroteo Hernández ([id number]) on 12/27/2023 1:01:47 PM     Clinical information reviewed:   Tobacco  Allergies  Meds   Med " Hx  Surg Hx   Fam Hx  Soc Hx        NPO Detail:  NPO/Void Status  Date of Last Liquid: 01/25/24  Time of Last Liquid: 0000  Date of Last Solid: 01/25/24  Time of Last Solid: 0000         Physical Exam    Airway  Mallampati: II  TM distance: <3 FB  Neck ROM: full     Cardiovascular   Rhythm: regular     Dental     Comments: Crowns   Pulmonary   Breath sounds clear to auscultation     Abdominal            Anesthesia Plan    History of general anesthesia?: yes  History of complications of general anesthesia?: yes    ASA 2     general     The patient is not a current smoker.    intravenous induction   Postoperative administration of opioids is intended.  Trial extubation is planned.  Anesthetic plan and risks discussed with patient.  Use of blood products discussed with patient who.    Plan discussed with CRNA.

## 2024-01-26 VITALS
OXYGEN SATURATION: 96 % | TEMPERATURE: 97.9 F | HEART RATE: 77 BPM | DIASTOLIC BLOOD PRESSURE: 62 MMHG | WEIGHT: 136.02 LBS | BODY MASS INDEX: 25.03 KG/M2 | RESPIRATION RATE: 16 BRPM | SYSTOLIC BLOOD PRESSURE: 112 MMHG | HEIGHT: 62 IN

## 2024-01-26 PROBLEM — N87.9 DYSPLASIA OF CERVIX: Status: ACTIVE | Noted: 2024-01-26

## 2024-01-26 PROBLEM — N87.9 DYSPLASIA OF CERVIX: Status: RESOLVED | Noted: 2023-11-21 | Resolved: 2024-01-26

## 2024-01-26 PROBLEM — N87.9 DYSPLASIA OF CERVIX: Status: RESOLVED | Noted: 2024-01-26 | Resolved: 2024-01-26

## 2024-01-26 PROBLEM — R87.810 CERVICAL HIGH RISK HPV (HUMAN PAPILLOMAVIRUS) TEST POSITIVE: Status: RESOLVED | Noted: 2023-06-14 | Resolved: 2024-01-26

## 2024-01-26 PROCEDURE — 99231 SBSQ HOSP IP/OBS SF/LOW 25: CPT | Performed by: NURSE PRACTITIONER

## 2024-01-26 PROCEDURE — 7100000011 HC EXTENDED STAY RECOVERY HOURLY - NURSING UNIT

## 2024-01-26 PROCEDURE — 2500000001 HC RX 250 WO HCPCS SELF ADMINISTERED DRUGS (ALT 637 FOR MEDICARE OP): Performed by: OBSTETRICS & GYNECOLOGY

## 2024-01-26 RX ORDER — TRAMADOL HYDROCHLORIDE 50 MG/1
50 TABLET ORAL EVERY 6 HOURS PRN
Qty: 20 TABLET | Refills: 0 | Status: SHIPPED | OUTPATIENT
Start: 2024-01-26 | End: 2024-01-26 | Stop reason: HOSPADM

## 2024-01-26 RX ORDER — AMOXICILLIN 250 MG
2 CAPSULE ORAL 2 TIMES DAILY
Qty: 56 TABLET | Refills: 0 | Status: SHIPPED | OUTPATIENT
Start: 2024-01-26 | End: 2024-02-09

## 2024-01-26 RX ADMIN — SENNOSIDES AND DOCUSATE SODIUM 2 TABLET: 8.6; 5 TABLET ORAL at 08:09

## 2024-01-26 RX ADMIN — LEVOTHYROXINE SODIUM 75 MCG: 75 TABLET ORAL at 05:39

## 2024-01-26 ASSESSMENT — COGNITIVE AND FUNCTIONAL STATUS - GENERAL
MOBILITY SCORE: 24
DAILY ACTIVITIY SCORE: 24

## 2024-01-26 ASSESSMENT — PAIN SCALES - GENERAL: PAINLEVEL_OUTOF10: 0 - NO PAIN

## 2024-01-26 ASSESSMENT — PAIN - FUNCTIONAL ASSESSMENT: PAIN_FUNCTIONAL_ASSESSMENT: 0-10

## 2024-01-26 ASSESSMENT — ACTIVITIES OF DAILY LIVING (ADL): LACK_OF_TRANSPORTATION: NO

## 2024-01-26 NOTE — DISCHARGE SUMMARY
Discharge Diagnosis  Dysplasia of cervix    Issues Requiring Follow-Up  Post-surgical follow up     Test Results Pending At Discharge  Pending Labs       Order Current Status    Surgical Pathology Exam In process          Hospital Course  Patient is a 58 year old female who underwent a laparoscopic transvaginal hysterectomy with salpingo-oophorectomy, cystoscopy.  No intraoperative complications and she was discharged to PACU in stable condition.  She was admitted to the floor overnight for monitoring.  She is doing well, pain controlled.  Out of bed ad jordi.  Planning for DC home today.    Pertinent Physical Exam At Time of Discharge  Physical Exam  HENT:      Head: Normocephalic and atraumatic.   Eyes:      Extraocular Movements: Extraocular movements intact.      Conjunctiva/sclera: Conjunctivae normal.      Pupils: Pupils are equal, round, and reactive to light.   Cardiovascular:      Rate and Rhythm: Normal rate and regular rhythm.      Pulses: Normal pulses.   Pulmonary:      Breath sounds: Normal breath sounds.   Abdominal:      General: Bowel sounds are normal.      Palpations: Abdomen is soft.      Comments: Lap sites C/D/I, edges approximated, no erythema   Skin:     General: Skin is warm and dry.      Capillary Refill: Capillary refill takes less than 2 seconds.   Neurological:      General: No focal deficit present.      Mental Status: She is alert and oriented to person, place, and time.       Home Medications     Medication List      START taking these medications     sennosides-docusate sodium 8.6-50 mg tablet; Commonly known as:   Aide-Colace; Take 2 tablets by mouth 2 times a day for 14 days.   traMADol 50 mg tablet; Commonly known as: Ultram; Take 1 tablet (50 mg)   by mouth every 6 hours if needed for severe pain (7 - 10) for up to 5   days.     CONTINUE taking these medications     multivitamin tablet   Synthroid 75 mcg tablet; Generic drug: levothyroxine; TAKE 1 TABLET BY   MOUTH ONCE DAILY IN  THE MORNING 1 HOUR BEFORE BREAKFAST       Outpatient Follow-Up  Future Appointments   Date Time Provider Department Center   3/25/2024 10:30 AM Danii Cortez DO [id number]       JOHN Pozo-CNP

## 2024-01-26 NOTE — CARE PLAN
The patient's goals for this shift include:  -Patient will remain safe this shift by utilizing the call bell and recognizing limitations while in the hospital.  -Patient will sleep comfortably and request PRN interventions as they are needed.    Clinical goals for this shift include:  -Patient will remain HDS this shift    Update: Patient had an uneventful shift. The above goals are progressing. Care provided as ordered without issue. Patient tolerating ambulation to bathroom and voiding without issue. Lap sites remain intact with glue; no drainage. No pain reported throughout shift. No concerns at this time.

## 2024-01-26 NOTE — CARE PLAN
The patient's goals for the shift include  pt will remain HDS throughout shift    The clinical goals for the shift include Patient will remain HDEs this shift      Problem: Pain - Adult  Goal: Verbalizes/displays adequate comfort level or baseline comfort level  Outcome: Progressing     Problem: Safety - Adult  Goal: Free from fall injury  Outcome: Progressing     Problem: Discharge Planning  Goal: Discharge to home or other facility with appropriate resources  Outcome: Progressing     Problem: Chronic Conditions and Co-morbidities  Goal: Patient's chronic conditions and co-morbidity symptoms are monitored and maintained or improved  Outcome: Progressing

## 2024-01-26 NOTE — PROGRESS NOTES
01/26/24 0846   Discharge Planning   Living Arrangements Spouse/significant other   Support Systems Spouse/significant other   Assistance Needed A&OX3; independent with ADLs with no assistive devices; drives; room air at baseline and room air now   Type of Residence Private residence   Number of Stairs to Enter Residence 3   Number of Stairs Within Residence 15   Do you have animals or pets at home? Yes   Type of Animals or Pets 1 dog 'Philip'   Who is requesting discharge planning? Provider   Patient expects to be discharged to: home no needs. Patient denies need for home care   Does the patient need discharge transport arranged? Yes   RoundTrip coordination needed? Yes   Has discharge transport been arranged? No   Financial Resource Strain   How hard is it for you to pay for the very basics like food, housing, medical care, and heating? Not hard   Housing Stability   In the last 12 months, was there a time when you were not able to pay the mortgage or rent on time? N   In the last 12 months, how many places have you lived? 1   In the last 12 months, was there a time when you did not have a steady place to sleep or slept in a shelter (including now)? N   Transportation Needs   In the past 12 months, has lack of transportation kept you from medical appointments or from getting medications? no   In the past 12 months, has lack of transportation kept you from meetings, work, or from getting things needed for daily living? No     01/26/2024 0848am  Spoke with patient bedside and she stated she was comfortable and denied pain. Patient denied any home going needs including home care and will phone brother for transportation when ready to dc. Anticipated dc today approximately 12p or so. Will continue to follow.

## 2024-01-26 NOTE — DISCHARGE INSTRUCTIONS
- No sexual intercourse for 6 weeks  - no driving while taking narcotics  - Senna/Colace to prevent constipation  - drink 6-8 (8) glasses of water a day  - ok for Tylenol/Motrin for pain  - no soaking in bath tub, hot tub, swimming pool  - do not pick, pull, or peel surgical glue  - follow up with Dr. Cole as scheduled

## 2024-01-30 LAB
LABORATORY COMMENT REPORT: NORMAL
PATH REPORT.FINAL DX SPEC: NORMAL
PATH REPORT.GROSS SPEC: NORMAL
PATH REPORT.RELEVANT HX SPEC: NORMAL
PATH REPORT.TOTAL CANCER: NORMAL

## 2024-02-25 DIAGNOSIS — E03.9 HYPOTHYROIDISM, UNSPECIFIED TYPE: ICD-10-CM

## 2024-02-26 RX ORDER — LEVOTHYROXINE SODIUM 75 UG/1
TABLET ORAL
Qty: 30 TABLET | Refills: 11 | Status: SHIPPED | OUTPATIENT
Start: 2024-02-26

## 2024-02-28 ENCOUNTER — OFFICE VISIT (OUTPATIENT)
Dept: OBSTETRICS AND GYNECOLOGY | Facility: CLINIC | Age: 59
End: 2024-02-28
Payer: COMMERCIAL

## 2024-02-28 VITALS — SYSTOLIC BLOOD PRESSURE: 98 MMHG | DIASTOLIC BLOOD PRESSURE: 58 MMHG | WEIGHT: 116 LBS | BODY MASS INDEX: 21.22 KG/M2

## 2024-02-28 DIAGNOSIS — N76.0 BACTERIAL VAGINOSIS: ICD-10-CM

## 2024-02-28 DIAGNOSIS — B96.89 BACTERIAL VAGINOSIS: ICD-10-CM

## 2024-02-28 DIAGNOSIS — B96.89 BACTERIAL VAGINOSIS: Primary | ICD-10-CM

## 2024-02-28 DIAGNOSIS — N76.0 BACTERIAL VAGINOSIS: Primary | ICD-10-CM

## 2024-02-28 LAB
PH FLUID TYPE: 5.5
POC CLUE CELL WET PREP: PRESENT
POC TRICHOMONAS WET PREP: ABNORMAL
POC WBC WET PREP: ABNORMAL
POC YEAST CELLS WET PREP: ABNORMAL

## 2024-02-28 PROCEDURE — 99214 OFFICE O/P EST MOD 30 MIN: CPT | Performed by: MIDWIFE

## 2024-02-28 PROCEDURE — 83986 ASSAY PH BODY FLUID NOS: CPT | Performed by: MIDWIFE

## 2024-02-28 PROCEDURE — 87210 SMEAR WET MOUNT SALINE/INK: CPT | Performed by: MIDWIFE

## 2024-02-28 PROCEDURE — 1036F TOBACCO NON-USER: CPT | Performed by: MIDWIFE

## 2024-02-28 RX ORDER — METRONIDAZOLE 500 MG/1
500 TABLET ORAL 2 TIMES DAILY
Qty: 14 TABLET | Refills: 0 | Status: SHIPPED | OUTPATIENT
Start: 2024-02-28 | End: 2024-03-11 | Stop reason: ALTCHOICE

## 2024-02-28 RX ORDER — METRONIDAZOLE 500 MG/1
500 TABLET ORAL 2 TIMES DAILY
Qty: 10 TABLET | Refills: 0 | Status: SHIPPED | OUTPATIENT
Start: 2024-02-28 | End: 2024-02-28

## 2024-02-28 ASSESSMENT — ENCOUNTER SYMPTOMS: DYSURIA: 0

## 2024-02-28 NOTE — TELEPHONE ENCOUNTER
Detail Level: Detailed I spoke with pharmacy and clarified  RX.   Quality 130: Documentation Of Current Medications In The Medical Record: Current Medications Documented Quality 110: Preventive Care And Screening: Influenza Immunization: Influenza Immunization not Administered for Documented Reasons. Quality 402: Tobacco Use And Help With Quitting Among Adolescents: Patient screened for tobacco and never smoked Quality 431: Preventive Care And Screening: Unhealthy Alcohol Use - Screening: Patient not screened for unhealthy alcohol use using a systematic screening method

## 2024-02-28 NOTE — PROGRESS NOTES
Subjective   Patient ID: Cheryl Wyatt is a 58 y.o. female who presents for discharge and vaginal odor. This started about a week ago. Pt is s/p STEPHANIE 1/2024 and says that she has had small amount of vaginal bleeding off/on since her surgery.  She is otherwise feeling well.     HPI  PMHx: 1/2024 STEPHANIE; 2023 LSIL cannot r/o HSIL +HPV; h/o LEEPs x2  SocHx:last SA 11/2023  ROS: no pelvic pain, no urinary c/o, NAD  Review of Systems   Genitourinary:  Positive for vaginal discharge. Negative for dysuria and pelvic pain.        Vaginal odor       Objective   Physical Exam  Constitutional:       Appearance: Normal appearance. She is normal weight.   HENT:      Head: Normocephalic.   Pulmonary:      Effort: Pulmonary effort is normal.   Genitourinary:     General: Normal vulva.      Vagina: Vaginal discharge present.      Comments: Atrophic vulvovaginal changes  Off white vaginal discharge  Neurological:      Mental Status: She is alert.   Psychiatric:         Behavior: Behavior normal.         Thought Content: Thought content normal.         Assessment/Plan   Diagnoses and all orders for this visit:  Bacterial vaginosis  -     metroNIDAZOLE (FlagyL) 500 mg tablet; Take 1 tablet (500 mg) by mouth 2 times a day for 7 days.  -     POCT vaginal pH manually resulted  -     POCT Wet Mount manually resulted    Reassurance given exam findings  Vulvar care discussed  Pt to RTO for post op FU with Dr Cole 3/11/24         STEPHANIE Bravo, ND 02/28/24 11:24 AM

## 2024-03-11 ENCOUNTER — OFFICE VISIT (OUTPATIENT)
Dept: OBSTETRICS AND GYNECOLOGY | Facility: CLINIC | Age: 59
End: 2024-03-11
Payer: COMMERCIAL

## 2024-03-11 VITALS
BODY MASS INDEX: 22.26 KG/M2 | SYSTOLIC BLOOD PRESSURE: 112 MMHG | DIASTOLIC BLOOD PRESSURE: 60 MMHG | HEIGHT: 62 IN | WEIGHT: 121 LBS

## 2024-03-11 DIAGNOSIS — Z48.89 POSTOPERATIVE VISIT: Primary | ICD-10-CM

## 2024-03-11 PROCEDURE — 1036F TOBACCO NON-USER: CPT | Performed by: OBSTETRICS & GYNECOLOGY

## 2024-03-11 PROCEDURE — 99024 POSTOP FOLLOW-UP VISIT: CPT | Performed by: OBSTETRICS & GYNECOLOGY

## 2024-03-11 NOTE — PROGRESS NOTES
Subjective   Patient ID: Cheryl Wyatt is a 58 y.o. female who presents for Post-op.  Results  Surgical Pathology Exam (Order [id number])  Result Information    Status Priority Source  Final result (1/30/2024 1358) Routine UTERUS, CERVIX, FALLOPIAN TUBES AND OVARIES BILATERAL       Surgical Pathology Exam: Result Notes     Isabel Lux LPN  2/6/2024  8:44 AM EST       Cheryl notified via  regarding benign pathology.   Mitul Cole MD  2/5/2024  3:32 PM EST       Pathology benign.  Negative for cancer       Other Results from 1/25/2024     CBC Final result 1/25/2024   Basic Metabolic Panel Final result 1/25/2024   Type And Screen Final result 1/25/2024  Case Report                   Case Report  Surgical Pathology                                Case: [id number]                                  Authorizing Provider:  iMtul Cole MD            Collected:           01/25/2024 1135              Ordering Location:     [facility]   Received:            01/25/2024 1517                                     OR                                                                          Pathologist:           To Lr MD                                                        Specimen:    UTERUS, CERVIX, FALLOPIAN TUBES AND OVARIES BILATERAL, UTERUS, BILATERAL FALLOPIAN                 TUBES, AND BILATERAL OVARIES                                                                 FINAL DIAGNOSIS  A. UTERUS, CERVIX, FALLOPIAN TUBES AND OVARIES BILATERAL:   --CERVIX: NO EVIDENCE OF DYSPLASIA (ENTIRELY SUBMITTED)  --ENDOMETRIUM: INACTIVE  --MYOMETRIUM: SMALL LEIOMYOMAS  --FALLOPIAN TUBES: NO PATHOLOGIC DIAGNOSIS  --OVARIES: ATROPHIC WITH CORTICAL LESIONS CYSTS  Electronically signed by To Lr MD on 1/30/2024 at 1358    By the signature on this report, the individual or group listed as making the Final Interpretation/Diagnosis certifies that they have reviewed this case.     Clinical History  Pre-op  "diagnosis:  Dysplasia of cervix [N87.9]   (LSIL/ ASC-H)  Cervical high risk HPV (human papillomavirus) test positive [R87.810]   (HPV, non 16, 18)    Gross Description  A: Received in formalin, labeled with the patient's name and hospital number and \" uterus, bilateral fallopian tubes and bilateral ovaries\", is a uterus with attached cervix and right fallopian tube and ovary.  The presumed left fallopian tube and ovary is received detached in the container. The uterus is received mostly intact with a defect on the cervical to the JONATHAN, weighs 16.06 g, and measures 3.1 cm (cornu to cornu) x 5.7 cm (fundus to cervix) x 1.7 cm (anterior to posterior). The serosa is tan-red, smooth, and glistening. The circumferential margin of the cervix is inked black inked black.  Green ink is applied to the anterior aspect.  The cervix is reapproximated to measure 1.5 cm from 12:00 to 6:00 and 1.8 cm from 9:00 to 3:00, and the cervical os is disrupted and cannot be measured. The cervix is amputated from the uterus, and the cut surface of the lower uterine segment is inked blue. The ectocervix is pink-tan, smooth with focal areas of red-brown possible hemorrhage. The endocervical canal measures 1.0 cm and is unremarkable.  The endometrial cavity is 2.7 cm in length and 1.1 cm in width.  The endometrium is tan-red, smooth and measures up to 0.1 cm in greatest thickness.  The pink-tan rubbery myometrium measures up to 1.1 cm in greatest thickness.  Two tan-white whorled intramural nodules measuring 0.3 to 0.5 cm in greatest dimension identified.  No areas of hemorrhage, softening or calcification is identified.                                             The right fallopian tube measures 4.8 cm in length by 0.4 cm in diameter.  The serosa is purple-pink, smooth and glistening.  Paratubal cysts are not identified.  The fimbriated end is unremarkable. The cut surface reveals a patent lumen.  The right ovary, 2.3 x 1.4 x 1.2 cm, is firm " with a yellow cerebriform outer surface.  The cut surface is tan-white and yellow.  No gross lesions are identified.  The left fallopian tube measures 3.8 cm in length by 0.5 cm in diameter.  The serosa is pink-tan smooth and glistening.  Paratubal cysts are not identified. The fimbriated end is unremarkable.  The cut surface reveals a patent lumen.  The left ovary, 2.3 x 1.4 x 1.0 cm, is firm with a yellow cerebriform outer surface.  The cut surface is tan-white and yellow.  No gross lesions are identified. Representative sections are submitted in 16 cassettes.  Photographs have been taken.  MJ     Summary of Cassettes:  Specimen          Label     Site  A                        1           12-3 o'clock cervix                            2           12-3 o'clock, cervix trimmings                            3           3-6 o'clock cervix                            4           3-6 o'clock cervix trimmings                            5           6-9 o'clock cervix                            6           9-12 o'clock cervix                            7           9-12 o'clock, cervix trimmings                            8           anterior uterine wall (2 full thickness sections) to include largest intramural nodule                            9           posterior uterine wall (2 full thickness sections) to include entire smaller intramural nodule                            10         remaining largest intramural nodule                            11-12    right fallopian tube (entire fimbriated end & representative cross sections)                            13         right ovary (representative sections)                            14-15                 left fallopian tube (entire fimbriated end  & representative cross sections)                            16         left ovary (representative sections)            Reviewed pathology.  No postop issues.  Resume all normal activities.  Follow-up for annual exam January 2025.   Pathology benign        Review of Systems   All other systems reviewed and are negative.      Objective   Physical Exam  Constitutional:       Appearance: Normal appearance. She is normal weight.   Neurological:      Mental Status: She is alert.         Assessment/Plan   Postop visit after laparoscopic assisted hysterectomy with removal of both adnexa.  Incisions look great.  Resume all normal activities         Mitul Cole MD 03/11/24 2:55 PM

## 2024-03-19 NOTE — PROGRESS NOTES
"Cheryl Wyatt is a 58 y.o. female who presents for Follow-up (6 months; shaky all the time, sometimes worse than other times)    Tremor: Noticed it about 5 years ago. Getting worse slowly. Strong Fhx. Gets worse if she is nervous or in a hurry. She notices it is her head and hands. She does have trouble writing and drinking, depending on the day. Left side seems worse than her right.     Abnormal pap: She got hysterectomy (TAHBSO) in January. Feeling ok, \"normal again.\" She has followup in a year.      Hypothyroidism: On replacement, feeling good. Wants to stay at her current dose.      HLD: Diet controlled.      All other systems have been reviewed and are negative for complaint     Objective   /67 (BP Location: Left arm, Patient Position: Sitting, BP Cuff Size: Adult)   Pulse 67   Ht 1.575 m (5' 2\")   Wt 53.1 kg (117 lb)   BMI 21.40 kg/m²     Gen: No acute distress, alert and oriented x3, pleasant   HEENT: moist mucous membranes, b/l external auditory canals are clear of debris, TMs within normal limits, no oropharyngeal lesions, eomi, perrla   Neck: thyroid within normal limits, no lymphadenopathy   CV: RRR, normal S1/S2, no murmur   Resp: Clear to auscultation bilaterally, no wheezes or rhonchi appreciated  Abd: soft, nontender, non-distended, no guarding/rigidity, bowel sounds present  Extr: no edema, no calf tenderness  Derm: Skin is warm and dry, no rashes appreciated  Psych: mood is good, affect is congruent, good hygiene, normal speech and eye contact  Neuro: cranial nerves grossly intact, normal gait    Assessment/Plan     #Essential tremor  Ok to hold off on med for now    #Abnormal pap  Doing well since hysterectomy     #LAD  biopsy was negative  Has not followed up with ENT     #Hypothyroidism:  On replacement  TSH is low but she is feeling good     #HLD:  Diet controlled, [place] was under 2%  monitoring lipids     #Raynaud's disease:  Not on meds     HCM:  Declining flu shot, UTD for " COVID  Mammogram October  No further pap due to hysterectomy?  C-scope due at age 59

## 2024-03-25 ENCOUNTER — OFFICE VISIT (OUTPATIENT)
Dept: PRIMARY CARE | Facility: CLINIC | Age: 59
End: 2024-03-25
Payer: COMMERCIAL

## 2024-03-25 VITALS
BODY MASS INDEX: 21.53 KG/M2 | SYSTOLIC BLOOD PRESSURE: 103 MMHG | HEART RATE: 67 BPM | DIASTOLIC BLOOD PRESSURE: 67 MMHG | HEIGHT: 62 IN | WEIGHT: 117 LBS

## 2024-03-25 DIAGNOSIS — E03.9 HYPOTHYROIDISM, UNSPECIFIED TYPE: Primary | ICD-10-CM

## 2024-03-25 PROCEDURE — 1036F TOBACCO NON-USER: CPT | Performed by: FAMILY MEDICINE

## 2024-03-25 PROCEDURE — 99214 OFFICE O/P EST MOD 30 MIN: CPT | Performed by: FAMILY MEDICINE

## 2024-04-16 ENCOUNTER — LAB (OUTPATIENT)
Dept: LAB | Facility: LAB | Age: 59
End: 2024-04-16
Payer: COMMERCIAL

## 2024-04-16 DIAGNOSIS — E03.9 HYPOTHYROIDISM, UNSPECIFIED TYPE: ICD-10-CM

## 2024-04-16 LAB
ALBUMIN SERPL BCP-MCNC: 4.6 G/DL (ref 3.4–5)
ALP SERPL-CCNC: 72 U/L (ref 33–136)
ALT SERPL W P-5'-P-CCNC: 13 U/L (ref 7–45)
ANION GAP SERPL CALC-SCNC: 11 MMOL/L (ref 10–20)
AST SERPL W P-5'-P-CCNC: 19 U/L (ref 9–39)
BASOPHILS # BLD AUTO: 0.03 X10*3/UL (ref 0–0.1)
BASOPHILS NFR BLD AUTO: 0.5 %
BILIRUB SERPL-MCNC: 0.4 MG/DL (ref 0–1.2)
BUN SERPL-MCNC: 12 MG/DL (ref 6–23)
CALCIUM SERPL-MCNC: 9.7 MG/DL (ref 8.6–10.3)
CHLORIDE SERPL-SCNC: 104 MMOL/L (ref 98–107)
CO2 SERPL-SCNC: 30 MMOL/L (ref 21–32)
CREAT SERPL-MCNC: 0.84 MG/DL (ref 0.5–1.05)
EGFRCR SERPLBLD CKD-EPI 2021: 80 ML/MIN/1.73M*2
EOSINOPHIL # BLD AUTO: 0.12 X10*3/UL (ref 0–0.7)
EOSINOPHIL NFR BLD AUTO: 2.2 %
ERYTHROCYTE [DISTWIDTH] IN BLOOD BY AUTOMATED COUNT: 13.5 % (ref 11.5–14.5)
GLUCOSE SERPL-MCNC: 76 MG/DL (ref 74–99)
HCT VFR BLD AUTO: 39.5 % (ref 36–46)
HGB BLD-MCNC: 12.6 G/DL (ref 12–16)
IMM GRANULOCYTES # BLD AUTO: 0.01 X10*3/UL (ref 0–0.7)
IMM GRANULOCYTES NFR BLD AUTO: 0.2 % (ref 0–0.9)
LYMPHOCYTES # BLD AUTO: 1.72 X10*3/UL (ref 1.2–4.8)
LYMPHOCYTES NFR BLD AUTO: 31.3 %
MCH RBC QN AUTO: 29.7 PG (ref 26–34)
MCHC RBC AUTO-ENTMCNC: 31.9 G/DL (ref 32–36)
MCV RBC AUTO: 93 FL (ref 80–100)
MONOCYTES # BLD AUTO: 0.34 X10*3/UL (ref 0.1–1)
MONOCYTES NFR BLD AUTO: 6.2 %
NEUTROPHILS # BLD AUTO: 3.27 X10*3/UL (ref 1.2–7.7)
NEUTROPHILS NFR BLD AUTO: 59.6 %
NRBC BLD-RTO: 0 /100 WBCS (ref 0–0)
PLATELET # BLD AUTO: 266 X10*3/UL (ref 150–450)
POTASSIUM SERPL-SCNC: 4.2 MMOL/L (ref 3.5–5.3)
PROT SERPL-MCNC: 6.6 G/DL (ref 6.4–8.2)
RBC # BLD AUTO: 4.24 X10*6/UL (ref 4–5.2)
SODIUM SERPL-SCNC: 141 MMOL/L (ref 136–145)
T4 FREE SERPL-MCNC: 1.06 NG/DL (ref 0.61–1.12)
TSH SERPL-ACNC: 0.72 MIU/L (ref 0.44–3.98)
WBC # BLD AUTO: 5.5 X10*3/UL (ref 4.4–11.3)

## 2024-04-16 PROCEDURE — 84439 ASSAY OF FREE THYROXINE: CPT

## 2024-04-16 PROCEDURE — 36415 COLL VENOUS BLD VENIPUNCTURE: CPT

## 2024-04-16 PROCEDURE — 80053 COMPREHEN METABOLIC PANEL: CPT

## 2024-04-16 PROCEDURE — 85025 COMPLETE CBC W/AUTO DIFF WBC: CPT

## 2024-04-16 PROCEDURE — 84481 FREE ASSAY (FT-3): CPT

## 2024-04-16 PROCEDURE — 84443 ASSAY THYROID STIM HORMONE: CPT

## 2024-04-17 LAB — T3FREE SERPL-MCNC: 3.1 PG/ML (ref 2.3–4.2)

## 2024-09-23 NOTE — PROGRESS NOTES
Subjective   Patient ID: Cheryl Wyatt is a 58 y.o. female who presents for Follow-up (6 months; declining flu shot).    She ended up moving out end of March and moved back in but they ultimately got the house sold. She is staying with friends until she closes on a new house. She is not legally  but she is still going to court to get a protection from harrassment.     Tremor: Noticed it about 5 years ago. Getting worse slowly. Strong Fhx. Gets worse if she is nervous or in a hurry. She notices it is her head and hands. She does have trouble writing and drinking, depending on the day. Left side seems worse than her right.      Hypothyroidism: On replacement, feeling good. Wants to stay at her current dose.      HLD: Diet controlled.      All other systems have been reviewed and are negative for complaint     Objective   /66 (BP Location: Left arm, Patient Position: Sitting, BP Cuff Size: Adult)   Wt 53.1 kg (117 lb)   BMI 21.40 kg/m²     Gen: No acute distress, alert and oriented x3, pleasant   HEENT: moist mucous membranes, b/l external auditory canals are clear of debris, TMs within normal limits, no oropharyngeal lesions, eomi, perrla   Neck: thyroid within normal limits, no lymphadenopathy   CV: RRR, normal S1/S2, no murmur   Resp: Clear to auscultation bilaterally, no wheezes or rhonchi appreciated  Abd: soft, nontender, non-distended, no guarding/rigidity, bowel sounds present  Extr: no edema, no calf tenderness  Derm: Skin is warm and dry, no rashes appreciated  Psych: mood is good, affect is congruent, good hygiene, normal speech and eye contact  Neuro: cranial nerves grossly intact, normal gait    Assessment/Plan   #Essential tremor  Ok to hold off on med for now     #Abnormal pap  Doing well since hysterectomy     #LAD  biopsy was negative  Has not followed up with ENT     #Hypothyroidism:  On replacement  TSH is low but she is feeling good     #HLD:  Diet controlled, [place] was under  2%  monitoring lipids     #Raynaud's disease:  Not on meds     HCM:  Declining flu shot, UTD for COVID  Mammogram October  No further pap due to hysterectomy?  C-scope due at age 59

## 2024-09-30 ENCOUNTER — APPOINTMENT (OUTPATIENT)
Dept: PRIMARY CARE | Facility: CLINIC | Age: 59
End: 2024-09-30
Payer: COMMERCIAL

## 2024-09-30 VITALS — WEIGHT: 117 LBS | SYSTOLIC BLOOD PRESSURE: 102 MMHG | DIASTOLIC BLOOD PRESSURE: 66 MMHG | BODY MASS INDEX: 21.4 KG/M2

## 2024-09-30 DIAGNOSIS — E78.5 HYPERLIPIDEMIA, UNSPECIFIED HYPERLIPIDEMIA TYPE: ICD-10-CM

## 2024-09-30 DIAGNOSIS — E03.9 HYPOTHYROIDISM, UNSPECIFIED TYPE: Primary | ICD-10-CM

## 2024-09-30 DIAGNOSIS — Z12.31 SCREENING MAMMOGRAM FOR BREAST CANCER: ICD-10-CM

## 2024-09-30 PROCEDURE — 99214 OFFICE O/P EST MOD 30 MIN: CPT | Performed by: FAMILY MEDICINE

## 2024-09-30 NOTE — PATIENT INSTRUCTIONS
Radiology:  [place]:  [phone]    Mammogram due after 10/23/2024    Please do fasting labs 1 week prior to your next visit

## 2024-10-25 ENCOUNTER — HOSPITAL ENCOUNTER (OUTPATIENT)
Dept: RADIOLOGY | Facility: HOSPITAL | Age: 59
Discharge: HOME | End: 2024-10-25
Payer: COMMERCIAL

## 2024-10-25 VITALS — BODY MASS INDEX: 21.16 KG/M2 | WEIGHT: 115 LBS | HEIGHT: 62 IN

## 2024-10-25 DIAGNOSIS — Z12.31 SCREENING MAMMOGRAM FOR BREAST CANCER: ICD-10-CM

## 2024-10-25 PROCEDURE — 77067 SCR MAMMO BI INCL CAD: CPT

## 2025-01-13 ENCOUNTER — APPOINTMENT (OUTPATIENT)
Dept: OBSTETRICS AND GYNECOLOGY | Facility: CLINIC | Age: 60
End: 2025-01-13
Payer: COMMERCIAL

## 2025-01-13 VITALS
WEIGHT: 117 LBS | BODY MASS INDEX: 21.53 KG/M2 | DIASTOLIC BLOOD PRESSURE: 54 MMHG | HEIGHT: 62 IN | SYSTOLIC BLOOD PRESSURE: 98 MMHG

## 2025-01-13 DIAGNOSIS — N94.10 DYSPAREUNIA, FEMALE: ICD-10-CM

## 2025-01-13 DIAGNOSIS — Z01.411 ENCOUNTER FOR GYNECOLOGICAL EXAMINATION WITH ABNORMAL FINDING: Primary | ICD-10-CM

## 2025-01-13 DIAGNOSIS — N95.1 VAGINAL DRYNESS, MENOPAUSAL: ICD-10-CM

## 2025-01-13 PROCEDURE — 99212 OFFICE O/P EST SF 10 MIN: CPT | Performed by: MIDWIFE

## 2025-01-13 PROCEDURE — 99396 PREV VISIT EST AGE 40-64: CPT | Performed by: MIDWIFE

## 2025-01-13 PROCEDURE — 3008F BODY MASS INDEX DOCD: CPT | Performed by: MIDWIFE

## 2025-01-13 PROCEDURE — 1036F TOBACCO NON-USER: CPT | Performed by: MIDWIFE

## 2025-01-13 RX ORDER — ESTRADIOL 0.1 MG/G
CREAM VAGINAL
Qty: 42.5 G | Refills: 3 | Status: SHIPPED | OUTPATIENT
Start: 2025-01-13

## 2025-01-13 NOTE — PROGRESS NOTES
Subjective   Patient ID: Cheryl Wyatt is a 59 y.o. female who presents for annual. She c/o vaginal dryness off/on daily and pain/scant bleeding after digital penetration with new partner.  HPI  PMHx; G0; 1/2024 STEPHANIE; LEEP x2 prior to last pap 2023 LS[facility] cannot r/o HSIL +HPV; 10/2024 mammogram Neg  SocHx: new partner 4 wks they have not has penis/vagina IC  ROS: no pelvic pain, no urinary c/o, NAD  Review of Systems    Objective   Physical Exam  Vitals and nursing note reviewed.   Constitutional:       Appearance: Normal appearance.   HENT:      Nose: Nose normal.   Eyes:      Pupils: Pupils are equal, round, and reactive to light.   Neck:      Thyroid: No thyroid mass.   Cardiovascular:      Rate and Rhythm: Normal rate and regular rhythm.   Pulmonary:      Effort: Pulmonary effort is normal.      Breath sounds: Normal breath sounds.   Chest:      Chest wall: No mass.   Breasts:     Right: Normal.      Left: Normal.   Abdominal:      Palpations: Abdomen is soft. There is no mass.      Tenderness: There is no abdominal tenderness.   Genitourinary:     General: Normal vulva.      Labia:         Right: No rash, tenderness or lesion.         Left: No rash, tenderness or lesion.       Vagina: Normal.      Uterus: Absent.       Rectum: Normal.      Comments: Vulvovaginal atrophy  Cx absent  Adnexa absent  Musculoskeletal:         General: Normal range of motion.   Lymphadenopathy:      Cervical: No cervical adenopathy.      Lower Body: No right inguinal adenopathy. No left inguinal adenopathy.   Skin:     General: Skin is warm and dry.   Neurological:      Mental Status: She is alert and oriented to person, place, and time.      Motor: Motor function is intact.      Gait: Gait is intact.   Psychiatric:         Mood and Affect: Mood normal.         Assessment/Plan   Diagnoses and all orders for this visit:  Encounter for gynecological examination with abnormal finding  Vaginal dryness, menopausal  -     estradiol (Estrace)  0.01 % (0.1 mg/gram) vaginal cream; Insert 0.5 applicator full per vaginal q HS on Monday Wednesday and Friday for 2 wks and then taper to as needed use  Dyspareunia, female  -     estradiol (Estrace) 0.01 % (0.1 mg/gram) vaginal cream; Insert 0.5 applicator full per vaginal q HS on Monday Wednesday and Friday for 2 wks and then taper to as needed use    Exam findings and vulvar care discussed at length  RTO AE/PRN       JOHN Bravo-DELMER, ND 01/13/25 11:14 AM

## 2025-01-19 DIAGNOSIS — E03.9 HYPOTHYROIDISM, UNSPECIFIED TYPE: ICD-10-CM

## 2025-01-20 RX ORDER — LEVOTHYROXINE SODIUM 75 UG/1
TABLET ORAL
Qty: 30 TABLET | Refills: 11 | Status: SHIPPED | OUTPATIENT
Start: 2025-01-20

## 2025-03-18 LAB
ALBUMIN SERPL-MCNC: 4.5 G/DL (ref 3.6–5.1)
ALP SERPL-CCNC: 88 U/L (ref 37–153)
ALT SERPL-CCNC: 10 U/L (ref 6–29)
ANION GAP SERPL CALCULATED.4IONS-SCNC: 8 MMOL/L (CALC) (ref 7–17)
AST SERPL-CCNC: 16 U/L (ref 10–35)
BASOPHILS # BLD AUTO: 30 CELLS/UL (ref 0–200)
BASOPHILS NFR BLD AUTO: 0.7 %
BILIRUB SERPL-MCNC: 0.5 MG/DL (ref 0.2–1.2)
BUN SERPL-MCNC: 12 MG/DL (ref 7–25)
CALCIUM SERPL-MCNC: 9.3 MG/DL (ref 8.6–10.4)
CHLORIDE SERPL-SCNC: 104 MMOL/L (ref 98–110)
CHOLEST SERPL-MCNC: 303 MG/DL
CHOLEST/HDLC SERPL: 4.3 (CALC)
CO2 SERPL-SCNC: 29 MMOL/L (ref 20–32)
CREAT SERPL-MCNC: 0.77 MG/DL (ref 0.5–1.05)
EGFRCR SERPLBLD CKD-EPI 2021: 88 ML/MIN/1.73M2
EOSINOPHIL # BLD AUTO: 69 CELLS/UL (ref 15–500)
EOSINOPHIL NFR BLD AUTO: 1.6 %
ERYTHROCYTE [DISTWIDTH] IN BLOOD BY AUTOMATED COUNT: 13 % (ref 11–15)
GLUCOSE SERPL-MCNC: 73 MG/DL (ref 65–99)
HCT VFR BLD AUTO: 41 % (ref 35–45)
HDLC SERPL-MCNC: 71 MG/DL
HGB BLD-MCNC: 13.4 G/DL (ref 11.7–15.5)
LDLC SERPL CALC-MCNC: 208 MG/DL (CALC)
LYMPHOCYTES # BLD AUTO: 1312 CELLS/UL (ref 850–3900)
LYMPHOCYTES NFR BLD AUTO: 30.5 %
MCH RBC QN AUTO: 29.8 PG (ref 27–33)
MCHC RBC AUTO-ENTMCNC: 32.7 G/DL (ref 32–36)
MCV RBC AUTO: 91.1 FL (ref 80–100)
MONOCYTES # BLD AUTO: 344 CELLS/UL (ref 200–950)
MONOCYTES NFR BLD AUTO: 8 %
NEUTROPHILS # BLD AUTO: 2546 CELLS/UL (ref 1500–7800)
NEUTROPHILS NFR BLD AUTO: 59.2 %
NONHDLC SERPL-MCNC: 232 MG/DL (CALC)
PLATELET # BLD AUTO: 251 THOUSAND/UL (ref 140–400)
PMV BLD REES-ECKER: 11.1 FL (ref 7.5–12.5)
POTASSIUM SERPL-SCNC: 4.4 MMOL/L (ref 3.5–5.3)
PROT SERPL-MCNC: 6.7 G/DL (ref 6.1–8.1)
RBC # BLD AUTO: 4.5 MILLION/UL (ref 3.8–5.1)
SODIUM SERPL-SCNC: 141 MMOL/L (ref 135–146)
T3FREE SERPL-MCNC: 3.4 PG/ML (ref 2.3–4.2)
T4 FREE SERPL-MCNC: 1.7 NG/DL (ref 0.8–1.8)
T4 FREE SERPL-MCNC: 1.7 NG/DL (ref 0.8–1.8)
TRIGL SERPL-MCNC: 110 MG/DL
TSH SERPL-ACNC: 0.13 MIU/L (ref 0.4–4.5)
WBC # BLD AUTO: 4.3 THOUSAND/UL (ref 3.8–10.8)

## 2025-03-28 NOTE — PROGRESS NOTES
"Subjective   Patient ID: Cheryl Wyatt is a 59 y.o. female who presents for Follow-up (6 months; feels like something is in her throat, she often has trouble swallowing and feels like food \"goes down the wrong pipe\").    She ended up moving out end of March and moved back in but they ultimately got the house sold. She is staying with friends until she closes on a new house. She is not legally  but she is still going to court to get a protection from harrassment.     Choking sensation: Going on for a few weeks. Sometimes hard to swallow. When she flexes C-spine it get uncomfortable. Sometimes food is going through the wrong pipe. She is wondering if her glands could be swollen again.      Tremor: Noticed it about 5 years ago. Getting worse slowly. Strong Fhx. Gets worse if she is nervous or in a hurry. She notices it is her head and hands. She does have trouble writing and drinking, depending on the day. Left side seems worse than her right.      Hypothyroidism: On replacement, feeling good. Wants to stay at her current dose.      HLD: Diet controlled.      All other systems have been reviewed and are negative for complaint     Objective   /72 (BP Location: Left arm, Patient Position: Sitting, BP Cuff Size: Adult)   Pulse 63   Ht 1.575 m (5' 2\")   Wt 52.6 kg (116 lb)   BMI 21.22 kg/m²     Gen: No acute distress, alert and oriented x3, pleasant   HEENT: moist mucous membranes, b/l external auditory canals are clear of debris, TMs within normal limits, no oropharyngeal lesions, eomi, perrla   Neck: thyroid within normal limits, no lymphadenopathy   CV: RRR, normal S1/S2, no murmur   Resp: Clear to auscultation bilaterally, no wheezes or rhonchi appreciated  Abd: soft, nontender, non-distended, no guarding/rigidity, bowel sounds present  Extr: no edema, no calf tenderness  Derm: Skin is warm and dry, no rashes appreciated  Psych: mood is good, affect is congruent, good hygiene, normal speech and eye " contact  Neuro: cranial nerves grossly intact, normal gait    Patient Health Questionnaire-2 Score: 0 (3/31/2025  8:33 AM)        Assessment/Plan   #Essential tremor  Ok to hold off on med for now     #Abnormal pap  Doing well since hysterectomy     #LAD  biopsy was negative  Has not followed up with ENT     #Hypothyroidism:  On replacement  TSH is low but she is feeling good     #HLD:  Diet controlled, [place] was under 2%  monitoring lipids     #Raynaud's disease:  Not on meds     HCM:  Declining flu shot, UTD for COVID  Mammogram October  No further pap due to hysterectomy?  C-scope due at age 59, she wants to re-discuss next time

## 2025-03-31 ENCOUNTER — APPOINTMENT (OUTPATIENT)
Dept: PRIMARY CARE | Facility: CLINIC | Age: 60
End: 2025-03-31
Payer: COMMERCIAL

## 2025-03-31 VITALS
BODY MASS INDEX: 21.35 KG/M2 | HEART RATE: 63 BPM | DIASTOLIC BLOOD PRESSURE: 72 MMHG | HEIGHT: 62 IN | SYSTOLIC BLOOD PRESSURE: 111 MMHG | WEIGHT: 116 LBS

## 2025-03-31 DIAGNOSIS — E03.9 HYPOTHYROIDISM, UNSPECIFIED TYPE: Primary | ICD-10-CM

## 2025-03-31 PROCEDURE — 99214 OFFICE O/P EST MOD 30 MIN: CPT | Performed by: FAMILY MEDICINE

## 2025-03-31 PROCEDURE — 1036F TOBACCO NON-USER: CPT | Performed by: FAMILY MEDICINE

## 2025-03-31 PROCEDURE — 3008F BODY MASS INDEX DOCD: CPT | Performed by: FAMILY MEDICINE

## 2025-03-31 ASSESSMENT — PATIENT HEALTH QUESTIONNAIRE - PHQ9
SUM OF ALL RESPONSES TO PHQ9 QUESTIONS 1 AND 2: 0
2. FEELING DOWN, DEPRESSED OR HOPELESS: NOT AT ALL
1. LITTLE INTEREST OR PLEASURE IN DOING THINGS: NOT AT ALL

## 2025-03-31 NOTE — PATIENT INSTRUCTIONS
Schedule an appointment for labs at [facility]/patient or call [phone] 24 hours a day, 7 days a week.   You can also download the [facility] scheduling yanelis on your phone.

## 2025-10-01 ENCOUNTER — APPOINTMENT (OUTPATIENT)
Dept: PRIMARY CARE | Facility: CLINIC | Age: 60
End: 2025-10-01
Payer: MEDICARE

## (undated) DEVICE — GLOVE, SURGICAL, PROTEXIS PI , 6.0, PF, LF

## (undated) DEVICE — CAUTERY, PENCIL, PUSH BUTTON, SMOKE EVAC, 70MM

## (undated) DEVICE — KIT, MINOR, DOUBLE BASIN

## (undated) DEVICE — DRAPE, SHEET, FAN FOLDED, HALF, 44 X 58 IN, DISPOSABLE, LF, STERILE

## (undated) DEVICE — IRRIGATION SET, CYSTOSCOPY, TURP, Y, CONTINUOUS, 81 IN

## (undated) DEVICE — SPONGE, LAP, XRAY DECT, 4IN X 18IN, W/MASTER DMT, STERILE

## (undated) DEVICE — SUTURE, VICRYL, 0, 18 IN, MO-4, VIOLET

## (undated) DEVICE — NEEDLE, INSUFFLATION, 13GAX120MM, DISP

## (undated) DEVICE — GOWN, ASTOUND, L

## (undated) DEVICE — LIGASURE, V SEALER/DIVIDER  5MM BLUNT TIP

## (undated) DEVICE — SCISSOR, MINI ENDO CUT, TIPS, DISP

## (undated) DEVICE — DRAPE, LEGGINGS, 48 X 31 IN, STERILE, LF

## (undated) DEVICE — SUTURE, VICRYL, 2-0, 27 IN, CT-1, VIOLET

## (undated) DEVICE — CARE KIT, LAPAROSCOPIC, ADVANCED

## (undated) DEVICE — COVER HANDLE LIGHT, STERIS, BLUE, STERILE

## (undated) DEVICE — COUNTER, NEEDLE, FOAM STRIP, DOUBLE, W/BLADEGUARD, 30 COUNT

## (undated) DEVICE — ASSEMBLY, STRYKER FLOW 2, SUCTION IRRIGATOR, WITH TIP

## (undated) DEVICE — ACCESS SYS, KII SHIELDED BLADED, Z-THREAD, 5X100CM

## (undated) DEVICE — FLOSEAL, MATRIX, HEMOSTATIC, FULL STERILE PREP, 5ML

## (undated) DEVICE — TIP, SUCTION, YANKAUER, BULB, ADULT

## (undated) DEVICE — TUBE SET, PNEUMOLAR HEATED, SMOKE EVACU, HIGH-FLOW

## (undated) DEVICE — TRAY, FOLEY, URINE METER, 16FR, SILICONE, W/STATLOCK

## (undated) DEVICE — SUTURE, VICRYL, 4-0, 18 IN, PS2, UNDYED

## (undated) DEVICE — Device

## (undated) DEVICE — BLADE, CARBON STEEL, 10, STERILE, DISP

## (undated) DEVICE — TOWEL PACK, STERILE, 4/PACK, BLUE